# Patient Record
Sex: MALE | Race: WHITE | Employment: OTHER | URBAN - METROPOLITAN AREA
[De-identification: names, ages, dates, MRNs, and addresses within clinical notes are randomized per-mention and may not be internally consistent; named-entity substitution may affect disease eponyms.]

---

## 2017-03-20 ENCOUNTER — OFFICE VISIT (OUTPATIENT)
Dept: FAMILY MEDICINE CLINIC | Facility: CLINIC | Age: 72
End: 2017-03-20

## 2017-03-20 VITALS
WEIGHT: 193 LBS | BODY MASS INDEX: 32.55 KG/M2 | OXYGEN SATURATION: 98 % | DIASTOLIC BLOOD PRESSURE: 80 MMHG | SYSTOLIC BLOOD PRESSURE: 130 MMHG | HEIGHT: 64.5 IN | TEMPERATURE: 97 F | HEART RATE: 76 BPM | RESPIRATION RATE: 18 BRPM

## 2017-03-20 DIAGNOSIS — I10 ESSENTIAL HYPERTENSION WITH GOAL BLOOD PRESSURE LESS THAN 140/90: ICD-10-CM

## 2017-03-20 DIAGNOSIS — R13.10 DYSPHAGIA, UNSPECIFIED TYPE: ICD-10-CM

## 2017-03-20 DIAGNOSIS — M47.812 DJD (DEGENERATIVE JOINT DISEASE), CERVICAL: ICD-10-CM

## 2017-03-20 DIAGNOSIS — M47.816 OSTEOARTHRITIS OF LUMBAR SPINE, UNSPECIFIED SPINAL OSTEOARTHRITIS COMPLICATION STATUS: ICD-10-CM

## 2017-03-20 DIAGNOSIS — Z85.89 HISTORY OF HEAD AND NECK CANCER: ICD-10-CM

## 2017-03-20 DIAGNOSIS — C76.0 HEAD AND NECK CANCER (HCC): Primary | ICD-10-CM

## 2017-03-20 PROCEDURE — 99203 OFFICE O/P NEW LOW 30 MIN: CPT | Performed by: FAMILY MEDICINE

## 2017-03-20 RX ORDER — HYDROCODONE BITARTRATE AND IBUPROFEN 7.5; 2 MG/1; MG/1
TABLET, FILM COATED ORAL
Qty: 90 TABLET | Refills: 0 | Status: SHIPPED | OUTPATIENT
Start: 2017-03-20 | End: 2017-04-18

## 2017-03-21 NOTE — PATIENT INSTRUCTIONS
Chronic Pain  Pain serves an important role. It lets you know something is wrong that needs your attention. When the body heals, pain normally goes away. When pain lasts longer than six months, it is called “chronic” pain.  This is pain that is present e · Changing certain habits can help reduce chronic pain.  They include:  · Eating healthy  · Developing an exercise routine  · Getting enough sleep at night  · Stopping smoking and limiting alcohol use  · Losing excess weight  Follow-up care  Follow up with You may need to follow a dysphagia diet for only a short time. Or you may be on it for a while. It depends on what is causing your dysphagia and how serious it is. A speech-language pathologist (SLP) assesses a person with dysphagia.  The SLP will determine You may also need to make liquids thicker. You can manage your liquids by making thin liquids thicker. This is done by adding a flavorless gel, gum, powder, or other liquid to it. These are called thickeners. You can also buy pre-thickened liquids.  Talk wi While you are on a dysphagia diet  · Follow all instructions about what food and drink you can have. · Do swallowing exercises as advised. · Do not change your food or liquids, even if your swallowing gets better.  Talk with your health care provider heena · Treat an allergic disorder of the esophagus that is causing the problem  · Are injected into the esophagus to help symptoms  Esophageal dilation  Dilation is a procedure that your provider can use to widen your esophagus.  It is most often done when a livier If you have high blood pressure, you may not have any symptoms. If you do have symptoms, they may include headache, dizziness, changes in your vision, chest pain, and shortness of breath.  But even without symptoms, high blood pressure that’s not treated ra · Don’t take medicines that have heart stimulants. This includes many cold and sinus decongestant pills and sprays, as well as diet pills. Check the warnings about hypertension on the label.  Stimulants such as amphetamine or cocaine could be lethal for latonya © 6449-3886 [address], [address] [place]. All rights reserved. This information is not intended as a substitute for professional medical care. Always follow your healthcare professional's instructions.

## 2017-03-21 NOTE — PROGRESS NOTES
[facility] Family Medicine Office Note  Chief Complaint:   Patient presents with:  Consult: new pt, needs referral for Dr Vladimir Guzman      HPI:   This is a 66year old male coming in for establishment of care due to h/o head and neck cancer del Dr. Maye [place]      Social History:    Smoking Status: Former Smoker                   Packs/Day: 0.00  Years:         Smokeless Status: Never Used                        Comment: >10 years    Alcohol Use: No              Family History:  Family History   Prob Height as of this encounter: 64.5\". Weight as of this encounter: 193 lb. Vital signs reviewed. Appears stated age, well groomed.   Physical Exam:  GEN:  Patient is alert and oriented x3, no apparent distress  HEAD:  Normocephalic, atraumatic  HEENT:  E prescription and he is only taking his prescription. He states he only takes hydrocodone once every few days and not every 8 hours so I explained to him that this prescription should last at least 1-2 months and he agreed.   Will do urine drug testing if s

## 2017-04-18 DIAGNOSIS — M47.816 OSTEOARTHRITIS OF LUMBAR SPINE, UNSPECIFIED SPINAL OSTEOARTHRITIS COMPLICATION STATUS: ICD-10-CM

## 2017-04-18 DIAGNOSIS — M47.812 DJD (DEGENERATIVE JOINT DISEASE), CERVICAL: Primary | ICD-10-CM

## 2017-04-18 NOTE — TELEPHONE ENCOUNTER
Pt called requesting a refill of hydrocodone-ibuprofen 7.5-200 MG Oral Tab. Pt will  when ready. Please advise.

## 2017-04-19 RX ORDER — HYDROCODONE BITARTRATE AND IBUPROFEN 7.5; 2 MG/1; MG/1
TABLET, FILM COATED ORAL
Qty: 90 TABLET | Refills: 0 | Status: SHIPPED | OUTPATIENT
Start: 2017-04-19 | End: 2017-05-17

## 2017-05-04 ENCOUNTER — TELEPHONE (OUTPATIENT)
Dept: FAMILY MEDICINE CLINIC | Facility: CLINIC | Age: 72
End: 2017-05-04

## 2017-05-04 RX ORDER — DIAZEPAM 10 MG/1
10 TABLET ORAL 2 TIMES DAILY PRN
Qty: 15 TABLET | Refills: 0 | Status: SHIPPED | OUTPATIENT
Start: 2017-05-04 | End: 2017-07-27

## 2017-05-04 NOTE — TELEPHONE ENCOUNTER
Pt is asking for diazepam (VALIUM) 10 MG Oral Tab to be sent to [facility] [address] [facility], [address] [facility] [address], [phone], [phone]. Pt states that his old Doctor used to prescribe it for him.   Pt was informed

## 2017-05-04 NOTE — TELEPHONE ENCOUNTER
See note below, pt has first visit with  3/20/17, this med last refill with old MD, Dr Nimisha Gayle in Jan. Please advise.

## 2017-05-04 NOTE — TELEPHONE ENCOUNTER
Okay to refill Valium 5 mg 1 tablet twice a day as needed #15 tablets no refills. Patient is to contact Dr. Maris Oglesby for further refills. I would suggest to follow-up in the office.

## 2017-05-10 RX ORDER — DIAZEPAM 10 MG/1
10 TABLET ORAL EVERY 6 HOURS PRN
Qty: 90 TABLET | Refills: 0 | Status: SHIPPED | OUTPATIENT
Start: 2017-05-10 | End: 2017-07-19

## 2017-05-10 NOTE — TELEPHONE ENCOUNTER
Rx pended. Please call pt to have him schedule f/u in June with Dr Jeffrey eSxton for 3 mo follow up meds. Thanks.

## 2017-05-17 DIAGNOSIS — M47.816 OSTEOARTHRITIS OF LUMBAR SPINE, UNSPECIFIED SPINAL OSTEOARTHRITIS COMPLICATION STATUS: ICD-10-CM

## 2017-05-17 DIAGNOSIS — M47.812 DJD (DEGENERATIVE JOINT DISEASE), CERVICAL: Primary | ICD-10-CM

## 2017-05-17 RX ORDER — HYDROCODONE BITARTRATE AND IBUPROFEN 7.5; 2 MG/1; MG/1
TABLET, FILM COATED ORAL
Qty: 90 TABLET | Refills: 0 | Status: SHIPPED | OUTPATIENT
Start: 2017-05-17 | End: 2017-06-14

## 2017-05-17 NOTE — TELEPHONE ENCOUNTER
Pt is calling for refill hydrocodone-ibuprofen 7.5-200 MG Oral Tab. Please advise when ready [phone].

## 2017-05-26 ENCOUNTER — OFFICE VISIT (OUTPATIENT)
Dept: FAMILY MEDICINE CLINIC | Facility: CLINIC | Age: 72
End: 2017-05-26

## 2017-05-26 VITALS
WEIGHT: 190 LBS | BODY MASS INDEX: 29.82 KG/M2 | OXYGEN SATURATION: 94 % | HEART RATE: 89 BPM | SYSTOLIC BLOOD PRESSURE: 142 MMHG | HEIGHT: 67 IN | DIASTOLIC BLOOD PRESSURE: 70 MMHG | TEMPERATURE: 98 F

## 2017-05-26 DIAGNOSIS — R05.9 COUGH: ICD-10-CM

## 2017-05-26 DIAGNOSIS — J00 ACUTE NASOPHARYNGITIS: Primary | ICD-10-CM

## 2017-05-26 PROCEDURE — 99213 OFFICE O/P EST LOW 20 MIN: CPT | Performed by: FAMILY MEDICINE

## 2017-05-26 RX ORDER — AMOXICILLIN AND CLAVULANATE POTASSIUM 600; 42.9 MG/5ML; MG/5ML
875 POWDER, FOR SUSPENSION ORAL 2 TIMES DAILY
Qty: 140 ML | Refills: 0 | Status: SHIPPED | OUTPATIENT
Start: 2017-05-26 | End: 2017-06-05

## 2017-05-26 RX ORDER — ALBUTEROL SULFATE 90 UG/1
2 AEROSOL, METERED RESPIRATORY (INHALATION) EVERY 4 HOURS PRN
Qty: 18 G | Refills: 0 | Status: SHIPPED | OUTPATIENT
Start: 2017-05-26 | End: 2017-07-27 | Stop reason: ALTCHOICE

## 2017-05-26 NOTE — PROGRESS NOTES
CHIEF COMPLAINT:   Patient presents with:  Cough/URI: x 3-4 days. getting worse. keeping pt up at night. no runny nose or sore throat. no headache. HPI:   Beckie Lozano is a 66year old male who presents for cough for  4  days.   Cough started grad Basal cell carcinoma, eyelid 12/2/2013   • DVT (deep venous thrombosis) ([facility] 12/2/2013   • Chronic back pain 12/2/2013   • Unspecified essential hypertension      benign   • Mouth cancer [facility])      dr Kj roe/dr spencer   • Low back pain       Social Albuterol Sulfate HFA (PROAIR HFA) 108 (90 Base) MCG/ACT Inhalation Aero Soln 18 g 0      Sig: Inhale 2 puffs into the lungs every 4 (four) hours as needed for Wheezing. Patient Instructions   Take antibiotics with food and plenty of water.    E

## 2017-05-26 NOTE — PATIENT INSTRUCTIONS
Take antibiotics with food and plenty of water. Eat yogurt or take probiotic daily. (Brian Hamilton is a good example of an OTC probiotic)  Make sure to finish the entire antibiotic treatment.   Increase fluids and rest.   Use inhaler-- 2 puffs every 4-6 hours as

## 2017-06-14 DIAGNOSIS — M47.812 DJD (DEGENERATIVE JOINT DISEASE), CERVICAL: Primary | ICD-10-CM

## 2017-06-14 DIAGNOSIS — M47.816 OSTEOARTHRITIS OF LUMBAR SPINE, UNSPECIFIED SPINAL OSTEOARTHRITIS COMPLICATION STATUS: ICD-10-CM

## 2017-06-14 RX ORDER — HYDROCODONE BITARTRATE AND IBUPROFEN 7.5; 2 MG/1; MG/1
TABLET, FILM COATED ORAL
Qty: 90 TABLET | Refills: 0 | Status: SHIPPED | OUTPATIENT
Start: 2017-06-14 | End: 2017-06-16

## 2017-06-14 NOTE — TELEPHONE ENCOUNTER
hydrocodone-ibuprofen 7.5-200 MG Oral Tab he has 6 pills left     Pt will  when ready please call [phone]

## 2017-06-16 ENCOUNTER — OFFICE VISIT (OUTPATIENT)
Dept: HEMATOLOGY/ONCOLOGY | Facility: HOSPITAL | Age: 72
End: 2017-06-16
Attending: INTERNAL MEDICINE
Payer: MEDICARE

## 2017-06-16 VITALS
WEIGHT: 185 LBS | HEART RATE: 76 BPM | OXYGEN SATURATION: 93 % | SYSTOLIC BLOOD PRESSURE: 139 MMHG | TEMPERATURE: 99 F | RESPIRATION RATE: 20 BRPM | HEIGHT: 67.01 IN | BODY MASS INDEX: 29.03 KG/M2 | DIASTOLIC BLOOD PRESSURE: 90 MMHG

## 2017-06-16 DIAGNOSIS — M47.816 OSTEOARTHRITIS OF LUMBAR SPINE, UNSPECIFIED SPINAL OSTEOARTHRITIS COMPLICATION STATUS: ICD-10-CM

## 2017-06-16 DIAGNOSIS — R13.10 DYSPHAGIA, UNSPECIFIED TYPE: ICD-10-CM

## 2017-06-16 DIAGNOSIS — C76.0 HEAD AND NECK CANCER (HCC): Primary | ICD-10-CM

## 2017-06-16 DIAGNOSIS — M47.812 DJD (DEGENERATIVE JOINT DISEASE), CERVICAL: ICD-10-CM

## 2017-06-16 PROCEDURE — 99214 OFFICE O/P EST MOD 30 MIN: CPT | Performed by: INTERNAL MEDICINE

## 2017-06-16 RX ORDER — HYDROCODONE BITARTRATE AND IBUPROFEN 7.5; 2 MG/1; MG/1
TABLET, FILM COATED ORAL
Qty: 90 TABLET | Refills: 0 | Status: SHIPPED | COMMUNITY
Start: 2017-06-16 | End: 2017-08-07

## 2017-06-16 NOTE — PROGRESS NOTES
Education Record    Learner:  Patient    Disease / Diagnosis:    Barriers / Limitations:  None   Comments:    Method:  Discussion   Comments:    General Topics:  Plan of care reviewed   Comments:    Outcome:  Shows understanding   Comments:    CT of soft t

## 2017-06-16 NOTE — PROGRESS NOTES
Cancer Center Progress Note  Patient Name: Db Rojas   YOB: 1945   Medical Record Number: [id number]   CSN: [id number]    Attending Physician: Jael Silveira M.D.      Date of Visit: 6/16/2017      Chief Complaint:  Patient presents • Head and neck cancer ([facility] 7/25/2013   • Basal cell carcinoma, eyelid 12/2/2013   • DVT (deep venous thrombosis) ([facility] 12/2/2013   • Chronic back pain 12/2/2013   • Unspecified essential hypertension      benign   • Mouth cancer [facility])      dr Jm Queen TAKE 1 TABLET (100 MG TOTAL) BY MOUTH ONCE DAILY. , Disp: 90 tablet, Rfl: 1  •  AmLODIPine Besylate 10 MG Oral Tab, TAKE 1 TABLET (10 MG TOTAL) BY MOUTH DAILY. , Disp: 90 tablet, Rfl: 1  •  [DISCONTINUED] hydrocodone-ibuprofen 7.5-200 MG Oral Tab, TAKE 1 TAB hepatosplenomegaly. Extremities Normal - No cyanosis, clubbing or edema. Integumentary Normal - No rashes and noJaundice   Neurologic Normal - No sensory or motor deficits, normal cerebellar function, normal gait, cranial nerves intact.    Psychiatric Ref Range: 0.90 - 4.00 x10(3) uL 0.94   Monocytes Absolute Latest Ref Range: 0.10 - 0.60 x10(3) uL 0.45   Eosinophils Absolute Latest Ref Range: 0.00 - 0.30 x10(3) uL 0.42 (H)   Basophils Absolute Latest Ref Range: 0.00 - 0.10 x10(3) uL 0.02   Immature Gra

## 2017-07-05 ENCOUNTER — TELEPHONE (OUTPATIENT)
Dept: HEMATOLOGY/ONCOLOGY | Facility: HOSPITAL | Age: 72
End: 2017-07-05

## 2017-07-05 DIAGNOSIS — C76.0 HEAD AND NECK CANCER (HCC): Primary | ICD-10-CM

## 2017-07-07 ENCOUNTER — HOSPITAL ENCOUNTER (OUTPATIENT)
Dept: CT IMAGING | Facility: HOSPITAL | Age: 72
Discharge: HOME | End: 2017-07-07
Attending: INTERNAL MEDICINE
Payer: MEDICARE

## 2017-07-07 DIAGNOSIS — C76.0 HEAD AND NECK CANCER (HCC): ICD-10-CM

## 2017-07-07 PROCEDURE — 70491 CT SOFT TISSUE NECK W/DYE: CPT | Performed by: INTERNAL MEDICINE

## 2017-07-17 NOTE — TELEPHONE ENCOUNTER
AmLODIPine Besylate 10 MG Oral Tab ( pt only has 2 pills of this)    Losartan Potassium 100 MG Oral Tab    atenolol 50 MG Oral Tab    diazepam (VALIUM) 10 MG Oral Tab    These have not been filled my Dr. Charity Richards before pt saw Dr. Charity Richards for the first time 3/20/

## 2017-07-17 NOTE — TELEPHONE ENCOUNTER
Pt needs med check for his meds (htn) per Dr Mauro Hernandez note from vs in march. He was due in June. Have him schedule so we can fill to appt. Thanks.

## 2017-07-19 RX ORDER — DIAZEPAM 10 MG/1
10 TABLET ORAL EVERY 6 HOURS PRN
Qty: 90 TABLET | Refills: 0 | Status: SHIPPED | OUTPATIENT
Start: 2017-07-19 | End: 2017-07-27

## 2017-07-19 RX ORDER — AMLODIPINE BESYLATE 10 MG/1
TABLET ORAL
Qty: 90 TABLET | Refills: 0 | Status: SHIPPED | OUTPATIENT
Start: 2017-07-19 | End: 2017-07-27

## 2017-07-19 RX ORDER — LOSARTAN POTASSIUM 100 MG/1
TABLET ORAL
Qty: 90 TABLET | Refills: 0 | Status: SHIPPED | OUTPATIENT
Start: 2017-07-19 | End: 2017-07-27

## 2017-07-25 RX ORDER — METOPROLOL SUCCINATE 50 MG/1
50 TABLET, EXTENDED RELEASE ORAL DAILY
Qty: 90 TABLET | Refills: 0 | Status: SHIPPED | OUTPATIENT
Start: 2017-07-25 | End: 2017-11-21

## 2017-07-25 NOTE — TELEPHONE ENCOUNTER
Atenolol Rx can't be filled at this time   Atenolol generic is nationwide back ordered   Brand name is expensive    Pharm called for alternative order for now    Thanks. Please advise.

## 2017-07-27 ENCOUNTER — APPOINTMENT (OUTPATIENT)
Dept: LAB | Age: 72
End: 2017-07-27
Attending: FAMILY MEDICINE
Payer: MEDICARE

## 2017-07-27 ENCOUNTER — OFFICE VISIT (OUTPATIENT)
Dept: FAMILY MEDICINE CLINIC | Facility: CLINIC | Age: 72
End: 2017-07-27

## 2017-07-27 VITALS
RESPIRATION RATE: 16 BRPM | DIASTOLIC BLOOD PRESSURE: 84 MMHG | HEART RATE: 64 BPM | BODY MASS INDEX: 27.94 KG/M2 | SYSTOLIC BLOOD PRESSURE: 130 MMHG | HEIGHT: 67 IN | TEMPERATURE: 99 F | WEIGHT: 178 LBS

## 2017-07-27 DIAGNOSIS — Z12.5 SCREENING FOR PROSTATE CANCER: ICD-10-CM

## 2017-07-27 DIAGNOSIS — M47.812 DJD (DEGENERATIVE JOINT DISEASE), CERVICAL: ICD-10-CM

## 2017-07-27 DIAGNOSIS — F41.1 GENERALIZED ANXIETY DISORDER: ICD-10-CM

## 2017-07-27 DIAGNOSIS — M47.816 OSTEOARTHRITIS OF LUMBAR SPINE, UNSPECIFIED SPINAL OSTEOARTHRITIS COMPLICATION STATUS: ICD-10-CM

## 2017-07-27 DIAGNOSIS — Z00.00 ROUTINE GENERAL MEDICAL EXAMINATION AT A HEALTH CARE FACILITY: Primary | ICD-10-CM

## 2017-07-27 DIAGNOSIS — N40.0 ENLARGED PROSTATE ON RECTAL EXAMINATION: ICD-10-CM

## 2017-07-27 DIAGNOSIS — Z85.89 HISTORY OF HEAD AND NECK CANCER: Chronic | ICD-10-CM

## 2017-07-27 DIAGNOSIS — H91.90 HEARING LOSS, UNSPECIFIED HEARING LOSS TYPE, UNSPECIFIED LATERALITY: Chronic | ICD-10-CM

## 2017-07-27 DIAGNOSIS — I10 ESSENTIAL HYPERTENSION: Chronic | ICD-10-CM

## 2017-07-27 DIAGNOSIS — M54.6 CHRONIC THORACIC BACK PAIN, UNSPECIFIED BACK PAIN LATERALITY: Chronic | ICD-10-CM

## 2017-07-27 DIAGNOSIS — Z23 NEED FOR VACCINATION: ICD-10-CM

## 2017-07-27 DIAGNOSIS — R13.10 DYSPHAGIA, UNSPECIFIED TYPE: Chronic | ICD-10-CM

## 2017-07-27 DIAGNOSIS — Z00.00 ENCOUNTER FOR MEDICARE ANNUAL WELLNESS EXAM: ICD-10-CM

## 2017-07-27 DIAGNOSIS — C44.111: Chronic | ICD-10-CM

## 2017-07-27 DIAGNOSIS — G89.29 CHRONIC THORACIC BACK PAIN, UNSPECIFIED BACK PAIN LATERALITY: Chronic | ICD-10-CM

## 2017-07-27 LAB
CHOLEST SMN-MCNC: 166 MG/DL (ref ?–200)
COMPLEXED PSA SERPL-MCNC: 1.85 NG/ML (ref 0.01–4)
HDLC SERPL-MCNC: 50 MG/DL (ref 45–?)
HDLC SERPL: 3.32 {RATIO} (ref ?–4.97)
LDLC SERPL CALC-MCNC: 92 MG/DL (ref ?–130)
LDLC SERPL-MCNC: 24 MG/DL (ref 5–40)
NONHDLC SERPL-MCNC: 116 MG/DL (ref ?–130)
TRIGLYCERIDES: 120 MG/DL (ref ?–150)

## 2017-07-27 PROCEDURE — G0439 PPPS, SUBSEQ VISIT: HCPCS | Performed by: FAMILY MEDICINE

## 2017-07-27 PROCEDURE — 96160 PT-FOCUSED HLTH RISK ASSMT: CPT | Performed by: FAMILY MEDICINE

## 2017-07-27 PROCEDURE — 90732 PPSV23 VACC 2 YRS+ SUBQ/IM: CPT | Performed by: FAMILY MEDICINE

## 2017-07-27 PROCEDURE — 36415 COLL VENOUS BLD VENIPUNCTURE: CPT | Performed by: FAMILY MEDICINE

## 2017-07-27 PROCEDURE — G0009 ADMIN PNEUMOCOCCAL VACCINE: HCPCS | Performed by: FAMILY MEDICINE

## 2017-07-27 RX ORDER — DIAZEPAM 10 MG/1
10 TABLET ORAL EVERY 12 HOURS PRN
Qty: 60 TABLET | Refills: 0 | Status: SHIPPED | OUTPATIENT
Start: 2017-07-27 | End: 2017-09-05

## 2017-07-27 RX ORDER — HYDROCODONE BITARTRATE AND IBUPROFEN 7.5; 2 MG/1; MG/1
TABLET, FILM COATED ORAL
Qty: 90 TABLET | Refills: 0 | Status: CANCELLED | OUTPATIENT
Start: 2017-07-27

## 2017-07-27 NOTE — PROGRESS NOTES
HPI:   Beckie Lozano is a 66year old male who presents for a MA (Medicare Advantage) Supervisit (Once per calendar year). Patient has no complaints or concerns today. Denies any recent illnesses or hospitalizations.   Prevnar up to date and pneumova history of Basal cell carcinoma, eyelid (12/2/2013); Chronic back pain (12/2/2013); DVT (deep venous thrombosis) ([facility] (12/2/2013); Head and neck cancer ([facility] (7/25/2013); HTN (hypertension) (12/2/2013);  Low back pain; Mouth cancer ([facility] and Unspecified e AIDE)           Visual Acuity  Right Eye Visual Acuity: Corrected Right Eye Chart Acuity: 20/40   Left Eye Visual Acuity: Corrected Left Eye Chart Acuity: 20/40   Both Eyes Visual Acuity: Corrected Both Eyes Chart Acuity: 20/40   Able To Tolerate Visual Ac examination at a [facility] facility  -  prevnar up to date  -  Pneumovax given today  -  Refuses colonoscopy  -  PSA and prostate exam done today    DJD (degenerative joint disease), cervical  -  Stable  -  Continue hydrocodone PRN    Osteoarthritis of l aspirin therapy outweigh the benefits and declines aspirin therapy            Diet assessment: good     Advanced Directive:  Living Will on file in [facility]? Tanika Rodriguez does not have a Living Will on file in [facility]  Discussed with patient and provided infor urine?: 0-No    Do you have difficulty seeing?: 1-Yes    Do you have any difficulty walking or getting up?: 1-Yes    Do you have any tripping hazards?: 0-No    Are you on multiple medications?: 1-Yes    Does pain affect your day to day activities?: 0-No found. Fecal Occult Blood Annually No results found for: FOB No flowsheet data found. Glaucoma Screening      Ophthalmology Visit Annually: Diabetics, FHx Glaucoma, AA>50, > 65 No flowsheet data found.     Prostate Cancer Screening      PSA

## 2017-07-27 NOTE — PATIENT INSTRUCTIONS
Jeaneth Brush's SCREENING SCHEDULE   Tests on this list are recommended by your physician but may not be covered, or covered at this frequency, by your insurer. Please check with your insurance carrier before scheduling to verify coverage.     PREVENTATI Maintenance if applicable    Flex Sigmoidoscopy Screen  Covered every 5 years No results found for this or any previous visit. No flowsheet data found.      Fecal Occult Blood   Covered Annually No results found for: FOB, OCCULTSTOOL No flowsheet data found Medically needed    Zoster (Not covered by Medicare Part B) No orders found for this or any previous visit. This may be covered with your pharmacy  prescription benefits     Recommended Websites for Advanced Directives    [email or web address] Depression All men in this age group At routine exams   Type 2 diabetes or prediabetes All adults beginning at age 39 and adults without symptoms at any age who are overweight or obese and have 1 or more other risk factors for diabetes At least every 3 y B (HIB) Men at increased risk for infection – talk with your healthcare provider 1 to 3 doses   Influenza (flu) All men in this age [de-identified] Once a year   Meningococcal Men at increased risk for infection – talk with your healthcare provider 1 or more doses

## 2017-08-07 ENCOUNTER — TELEPHONE (OUTPATIENT)
Dept: FAMILY MEDICINE CLINIC | Facility: CLINIC | Age: 72
End: 2017-08-07

## 2017-08-07 DIAGNOSIS — M47.816 OSTEOARTHRITIS OF LUMBAR SPINE, UNSPECIFIED SPINAL OSTEOARTHRITIS COMPLICATION STATUS: ICD-10-CM

## 2017-08-07 DIAGNOSIS — M47.812 DJD (DEGENERATIVE JOINT DISEASE), CERVICAL: ICD-10-CM

## 2017-08-07 RX ORDER — HYDROCODONE BITARTRATE AND IBUPROFEN 7.5; 2 MG/1; MG/1
TABLET, FILM COATED ORAL
Qty: 90 TABLET | Refills: 0 | Status: SHIPPED | OUTPATIENT
Start: 2017-08-07 | End: 2017-09-05

## 2017-08-07 NOTE — TELEPHONE ENCOUNTER
Pt states he called his [address] ([facility] [address] [facility] [place], [address] [facility] [address] [phone], [phone]) regarding a refill of atenolol and this med is currently on backorder.  Pt looking for substitute that has been given for this in the pa

## 2017-08-07 NOTE — TELEPHONE ENCOUNTER
Pt notified Dr. Dong Diehl sent a prescription for Metoprolol ER to take the place of the atenolol that is back ordered. Pt verbalized understanding.

## 2017-08-07 NOTE — TELEPHONE ENCOUNTER
I think Mo Ramesh already reached out to him but yes atenolol is on back order so I started him on Toprol 50 mg XL daily.

## 2017-08-07 NOTE — TELEPHONE ENCOUNTER
Atenolol is back ordered. He was on 50 mg daily. Do you want him to substitute with Metoprolol succinate ER 50 mg 1 daily? If not please advise what you would like him to use.

## 2017-09-05 DIAGNOSIS — M47.816 OSTEOARTHRITIS OF LUMBAR SPINE, UNSPECIFIED SPINAL OSTEOARTHRITIS COMPLICATION STATUS: ICD-10-CM

## 2017-09-05 DIAGNOSIS — F41.1 GENERALIZED ANXIETY DISORDER: ICD-10-CM

## 2017-09-05 DIAGNOSIS — M47.812 DJD (DEGENERATIVE JOINT DISEASE), CERVICAL: ICD-10-CM

## 2017-09-05 RX ORDER — HYDROCODONE BITARTRATE AND IBUPROFEN 7.5; 2 MG/1; MG/1
TABLET, FILM COATED ORAL
Qty: 90 TABLET | Refills: 0 | Status: SHIPPED | OUTPATIENT
Start: 2017-09-05 | End: 2017-10-03

## 2017-09-05 RX ORDER — DIAZEPAM 10 MG/1
10 TABLET ORAL EVERY 12 HOURS PRN
Qty: 60 TABLET | Refills: 0 | Status: SHIPPED | OUTPATIENT
Start: 2017-09-05 | End: 2017-10-25

## 2017-09-05 NOTE — TELEPHONE ENCOUNTER
Last fill for the hydrocodone-ibu 8/7 #90  Last fill valium 7/27 #60 at pt's px  Please approve if appropriate. Thanks.

## 2017-10-03 DIAGNOSIS — M47.816 OSTEOARTHRITIS OF LUMBAR SPINE, UNSPECIFIED SPINAL OSTEOARTHRITIS COMPLICATION STATUS: ICD-10-CM

## 2017-10-03 DIAGNOSIS — M47.812 DJD (DEGENERATIVE JOINT DISEASE), CERVICAL: ICD-10-CM

## 2017-10-03 NOTE — TELEPHONE ENCOUNTER
Last med visit 7/27/17 medicare advantage supervisit  Med last ordered 9/5/17 #90  **see med refill order pended for review** thanks

## 2017-10-04 RX ORDER — HYDROCODONE BITARTRATE AND IBUPROFEN 7.5; 2 MG/1; MG/1
TABLET, FILM COATED ORAL
Qty: 90 TABLET | Refills: 0 | Status: SHIPPED | OUTPATIENT
Start: 2017-10-04 | End: 2017-11-03

## 2017-10-25 DIAGNOSIS — F41.1 GENERALIZED ANXIETY DISORDER: ICD-10-CM

## 2017-10-25 RX ORDER — AMLODIPINE BESYLATE 10 MG/1
TABLET ORAL
Qty: 90 TABLET | Refills: 0 | Status: SHIPPED | OUTPATIENT
Start: 2017-10-25 | End: 2018-02-14

## 2017-10-25 RX ORDER — LOSARTAN POTASSIUM 100 MG/1
TABLET ORAL
Qty: 90 TABLET | Refills: 0 | Status: SHIPPED | OUTPATIENT
Start: 2017-10-25 | End: 2018-01-26

## 2017-10-26 RX ORDER — DIAZEPAM 10 MG/1
10 TABLET ORAL EVERY 12 HOURS PRN
Qty: 60 TABLET | Refills: 0 | Status: SHIPPED | OUTPATIENT
Start: 2017-10-26 | End: 2017-12-08

## 2017-11-02 ENCOUNTER — TELEPHONE (OUTPATIENT)
Dept: FAMILY MEDICINE CLINIC | Facility: CLINIC | Age: 72
End: 2017-11-02

## 2017-11-02 NOTE — TELEPHONE ENCOUNTER
Pt needs appt so we can fill. Per Dr Jimbo Giraldo pt's need to be seen q 3 months in ofc for pain meds. Last vs 7/27  Please have pt schedule. Thanks.

## 2017-11-03 ENCOUNTER — OFFICE VISIT (OUTPATIENT)
Dept: FAMILY MEDICINE CLINIC | Facility: CLINIC | Age: 72
End: 2017-11-03

## 2017-11-03 VITALS
SYSTOLIC BLOOD PRESSURE: 138 MMHG | HEIGHT: 67 IN | RESPIRATION RATE: 16 BRPM | OXYGEN SATURATION: 98 % | HEART RATE: 76 BPM | DIASTOLIC BLOOD PRESSURE: 84 MMHG | TEMPERATURE: 97 F | WEIGHT: 182 LBS | BODY MASS INDEX: 28.56 KG/M2

## 2017-11-03 DIAGNOSIS — M47.812 DJD (DEGENERATIVE JOINT DISEASE), CERVICAL: Primary | ICD-10-CM

## 2017-11-03 DIAGNOSIS — M47.816 OSTEOARTHRITIS OF LUMBAR SPINE, UNSPECIFIED SPINAL OSTEOARTHRITIS COMPLICATION STATUS: ICD-10-CM

## 2017-11-03 PROCEDURE — 99213 OFFICE O/P EST LOW 20 MIN: CPT | Performed by: FAMILY MEDICINE

## 2017-11-03 RX ORDER — HYDROCODONE BITARTRATE AND IBUPROFEN 7.5; 2 MG/1; MG/1
TABLET, FILM COATED ORAL
Qty: 90 TABLET | Refills: 0 | Status: SHIPPED | OUTPATIENT
Start: 2017-11-03 | End: 2017-11-29

## 2017-11-03 NOTE — PROGRESS NOTES
[facility] Family Medicine Office Note  Chief Complaint:   Patient presents with:  Medication Follow-Up      HPI:   This is a 74year old male coming in for follow-up of degenerative joint disease of the cervical spine as well as osteoarthritis o losartan 100 MG Oral Tab TAKE 1 TABLET(100 MG) BY MOUTH EVERY DAY Disp: 90 tablet Rfl: 0   Metoprolol Succinate ER (TOPROL XL) 50 MG Oral Tablet 24 Hr Take 1 tablet (50 mg total) by mouth daily.  Disp: 90 tablet Rfl: 0   ATENOLOL 50 MG Oral Tab TAKE 1 TAB osteoarthritis complication status  -  Stable  -  Continue hydrocodone PRN  -  Consider meloxicam at next OV  - hydrocodone-ibuprofen 7.5-200 MG Oral Tab; TAKE 1 TABLET BY MOUTH EVERY 8 HOURS AS NEEDED FOR PAIN  Dispense: 90 tablet;  Refill: 0      Meds & R

## 2017-11-21 RX ORDER — METOPROLOL SUCCINATE 50 MG/1
TABLET, EXTENDED RELEASE ORAL
Qty: 90 TABLET | Refills: 0 | Status: SHIPPED | OUTPATIENT
Start: 2017-11-21 | End: 2018-01-26

## 2017-11-29 DIAGNOSIS — M47.812 DJD (DEGENERATIVE JOINT DISEASE), CERVICAL: ICD-10-CM

## 2017-11-29 DIAGNOSIS — M47.816 OSTEOARTHRITIS OF LUMBAR SPINE, UNSPECIFIED SPINAL OSTEOARTHRITIS COMPLICATION STATUS: ICD-10-CM

## 2017-11-29 RX ORDER — HYDROCODONE BITARTRATE AND IBUPROFEN 7.5; 2 MG/1; MG/1
TABLET, FILM COATED ORAL
Qty: 90 TABLET | Refills: 0 | Status: SHIPPED | OUTPATIENT
Start: 2017-11-29 | End: 2017-12-27

## 2017-12-08 DIAGNOSIS — F41.1 GENERALIZED ANXIETY DISORDER: ICD-10-CM

## 2017-12-08 RX ORDER — DIAZEPAM 10 MG/1
TABLET ORAL
Qty: 60 TABLET | Refills: 0 | Status: SHIPPED | OUTPATIENT
Start: 2017-12-08 | End: 2018-01-05

## 2017-12-27 DIAGNOSIS — M47.812 DJD (DEGENERATIVE JOINT DISEASE), CERVICAL: ICD-10-CM

## 2017-12-27 DIAGNOSIS — M47.816 OSTEOARTHRITIS OF LUMBAR SPINE, UNSPECIFIED SPINAL OSTEOARTHRITIS COMPLICATION STATUS: ICD-10-CM

## 2017-12-27 RX ORDER — HYDROCODONE BITARTRATE AND IBUPROFEN 7.5; 2 MG/1; MG/1
TABLET, FILM COATED ORAL
Qty: 90 TABLET | Refills: 0 | Status: SHIPPED | OUTPATIENT
Start: 2017-12-27 | End: 2017-12-27

## 2017-12-27 RX ORDER — HYDROCODONE BITARTRATE AND IBUPROFEN 7.5; 2 MG/1; MG/1
TABLET, FILM COATED ORAL
Qty: 90 TABLET | Refills: 0 | Status: SHIPPED | OUTPATIENT
Start: 2017-12-27 | End: 2018-01-23

## 2017-12-27 NOTE — TELEPHONE ENCOUNTER
Pt requesting a refill of hydrocodone-ibuprofen 7.5-200 MG Oral Tab, has 2 to 3 left, per pt. Pt will  when ready. Please call [phone].

## 2018-01-03 ENCOUNTER — TELEPHONE (OUTPATIENT)
Dept: FAMILY MEDICINE CLINIC | Facility: CLINIC | Age: 73
End: 2018-01-03

## 2018-01-05 DIAGNOSIS — F41.1 GENERALIZED ANXIETY DISORDER: ICD-10-CM

## 2018-01-05 RX ORDER — DIAZEPAM 10 MG/1
TABLET ORAL
Qty: 60 TABLET | Refills: 0 | Status: SHIPPED | OUTPATIENT
Start: 2018-01-05 | End: 2018-02-02

## 2018-01-05 NOTE — TELEPHONE ENCOUNTER
Non protocol medication. LOV 11/3/2017. Last refill 12/8/2017.   Please approve if appropriate, thank you

## 2018-01-23 DIAGNOSIS — M47.816 OSTEOARTHRITIS OF LUMBAR SPINE, UNSPECIFIED SPINAL OSTEOARTHRITIS COMPLICATION STATUS: ICD-10-CM

## 2018-01-23 DIAGNOSIS — M47.812 DJD (DEGENERATIVE JOINT DISEASE), CERVICAL: ICD-10-CM

## 2018-01-23 NOTE — TELEPHONE ENCOUNTER
Pt would like refill of hydrocodone-ibuprofen 7.5-200 MG Oral Tab. He will . Please advise. Thank you.

## 2018-01-23 NOTE — TELEPHONE ENCOUNTER
Pt last ov 11/3/17 for DJD ( degenerative joint disease), last refill 12/27/17 #90 taking 3 times daily, pt should still have 3-4 days worth of medication.   Please review and approve if appropriate, thank you

## 2018-01-24 RX ORDER — HYDROCODONE BITARTRATE AND IBUPROFEN 7.5; 2 MG/1; MG/1
TABLET, FILM COATED ORAL
Qty: 90 TABLET | Refills: 0 | Status: SHIPPED | OUTPATIENT
Start: 2018-01-24 | End: 2018-02-21

## 2018-01-26 ENCOUNTER — TELEPHONE (OUTPATIENT)
Dept: FAMILY MEDICINE CLINIC | Facility: CLINIC | Age: 73
End: 2018-01-26

## 2018-01-26 RX ORDER — LOSARTAN POTASSIUM 100 MG/1
TABLET ORAL
Qty: 30 TABLET | Refills: 0 | Status: SHIPPED | OUTPATIENT
Start: 2018-01-26 | End: 2018-02-14

## 2018-01-26 RX ORDER — METOPROLOL SUCCINATE 50 MG/1
50 TABLET, EXTENDED RELEASE ORAL
Qty: 30 TABLET | Refills: 0 | Status: SHIPPED | OUTPATIENT
Start: 2018-01-26 | End: 2018-02-14

## 2018-02-02 ENCOUNTER — TELEPHONE (OUTPATIENT)
Dept: FAMILY MEDICINE CLINIC | Facility: CLINIC | Age: 73
End: 2018-02-02

## 2018-02-02 DIAGNOSIS — F41.1 GENERALIZED ANXIETY DISORDER: ICD-10-CM

## 2018-02-02 RX ORDER — DIAZEPAM 10 MG/1
TABLET ORAL
Qty: 60 TABLET | Refills: 0 | Status: SHIPPED | OUTPATIENT
Start: 2018-02-02 | End: 2018-03-26

## 2018-02-02 NOTE — TELEPHONE ENCOUNTER
Please call pt as he needs a med check per Dr Madelaine Hill in [facility],  Refill Valium pending.

## 2018-02-14 ENCOUNTER — APPOINTMENT (OUTPATIENT)
Dept: LAB | Age: 73
End: 2018-02-14
Attending: FAMILY MEDICINE
Payer: MEDICARE

## 2018-02-14 ENCOUNTER — OFFICE VISIT (OUTPATIENT)
Dept: FAMILY MEDICINE CLINIC | Facility: CLINIC | Age: 73
End: 2018-02-14

## 2018-02-14 VITALS
HEIGHT: 67 IN | BODY MASS INDEX: 27.78 KG/M2 | TEMPERATURE: 97 F | RESPIRATION RATE: 14 BRPM | WEIGHT: 177 LBS | OXYGEN SATURATION: 98 % | SYSTOLIC BLOOD PRESSURE: 130 MMHG | HEART RATE: 76 BPM | DIASTOLIC BLOOD PRESSURE: 80 MMHG

## 2018-02-14 DIAGNOSIS — M47.812 DJD (DEGENERATIVE JOINT DISEASE), CERVICAL: Primary | ICD-10-CM

## 2018-02-14 DIAGNOSIS — F41.1 GENERALIZED ANXIETY DISORDER: ICD-10-CM

## 2018-02-14 DIAGNOSIS — M47.816 OSTEOARTHRITIS OF LUMBAR SPINE, UNSPECIFIED SPINAL OSTEOARTHRITIS COMPLICATION STATUS: ICD-10-CM

## 2018-02-14 DIAGNOSIS — I10 ESSENTIAL HYPERTENSION WITH GOAL BLOOD PRESSURE LESS THAN 140/90: ICD-10-CM

## 2018-02-14 LAB
ALBUMIN SERPL-MCNC: 3.6 G/DL (ref 3.5–4.8)
ALP LIVER SERPL-CCNC: 69 U/L (ref 45–117)
ALT SERPL-CCNC: 23 U/L (ref 17–63)
AST SERPL-CCNC: 14 U/L (ref 15–41)
BILIRUB SERPL-MCNC: 0.6 MG/DL (ref 0.1–2)
BUN BLD-MCNC: 17 MG/DL (ref 8–20)
CALCIUM BLD-MCNC: 9.2 MG/DL (ref 8.3–10.3)
CHLORIDE: 105 MMOL/L (ref 101–111)
CO2: 28 MMOL/L (ref 22–32)
CREAT BLD-MCNC: 1.26 MG/DL (ref 0.7–1.3)
GLUCOSE BLD-MCNC: 84 MG/DL (ref 70–99)
M PROTEIN MFR SERPL ELPH: 7.5 G/DL (ref 6.1–8.3)
POTASSIUM SERPL-SCNC: 3.8 MMOL/L (ref 3.6–5.1)
SODIUM SERPL-SCNC: 140 MMOL/L (ref 136–144)

## 2018-02-14 PROCEDURE — 36415 COLL VENOUS BLD VENIPUNCTURE: CPT

## 2018-02-14 PROCEDURE — 99214 OFFICE O/P EST MOD 30 MIN: CPT | Performed by: FAMILY MEDICINE

## 2018-02-14 PROCEDURE — 80053 COMPREHEN METABOLIC PANEL: CPT

## 2018-02-14 RX ORDER — AMLODIPINE BESYLATE 10 MG/1
TABLET ORAL
Qty: 90 TABLET | Refills: 0 | Status: SHIPPED | OUTPATIENT
Start: 2018-02-14 | End: 2018-04-15

## 2018-02-14 RX ORDER — METOPROLOL SUCCINATE 50 MG/1
50 TABLET, EXTENDED RELEASE ORAL
Qty: 90 TABLET | Refills: 0 | Status: SHIPPED | OUTPATIENT
Start: 2018-02-14 | End: 2018-07-18

## 2018-02-14 RX ORDER — LOSARTAN POTASSIUM 100 MG/1
TABLET ORAL
Qty: 90 TABLET | Refills: 0 | Status: SHIPPED | OUTPATIENT
Start: 2018-02-14 | End: 2018-04-15

## 2018-02-14 NOTE — PROGRESS NOTES
[facility] Family Medicine Office Note  Chief Complaint:   Patient presents with:  Medication Follow-Up: HTN      HPI:   This is a 74year old male coming in for follow-up of degenerative joint disease of the cervical spine as well as osteoarthri Rfl: 0   losartan 100 MG Oral Tab TAKE 1 TABLET(100 MG) BY MOUTH EVERY DAY Disp: 90 tablet Rfl: 0   AmLODIPine Besylate 10 MG Oral Tab TAKE 1 TABLET(10 MG) BY MOUTH DAILY Disp: 90 tablet Rfl: 0   DIAZEPAM 10 MG Oral Tab TAKE ONE TABLET BY MOUTH EVERY TWELV 7.5-200 MG Oral Tab; TAKE 1 TABLET BY MOUTH EVERY 8 HOURS AS NEEDED FOR PAIN  Dispense: 90 tablet; Refill: 0    2.  Osteoarthritis of lumbar spine, unspecified spinal osteoarthritis complication status  -  Stable  -  Continue hydrocodone PRN  -  Not due for

## 2018-02-21 DIAGNOSIS — M47.816 OSTEOARTHRITIS OF LUMBAR SPINE, UNSPECIFIED SPINAL OSTEOARTHRITIS COMPLICATION STATUS: ICD-10-CM

## 2018-02-21 DIAGNOSIS — M47.812 DJD (DEGENERATIVE JOINT DISEASE), CERVICAL: ICD-10-CM

## 2018-02-21 RX ORDER — HYDROCODONE BITARTRATE AND IBUPROFEN 7.5; 2 MG/1; MG/1
TABLET, FILM COATED ORAL
Qty: 90 TABLET | Refills: 0 | Status: SHIPPED | OUTPATIENT
Start: 2018-02-21 | End: 2018-03-19

## 2018-02-21 NOTE — TELEPHONE ENCOUNTER
Patient requesting refill of hydrocodone-ibuprofen 7.5-200 MG Oral Tab. Patient informed of 48 hour refill policy excluding weekends and holidays. Patient will pick script up when ready.

## 2018-03-02 ENCOUNTER — OFFICE VISIT (OUTPATIENT)
Dept: FAMILY MEDICINE CLINIC | Facility: CLINIC | Age: 73
End: 2018-03-02

## 2018-03-02 VITALS
HEIGHT: 67 IN | OXYGEN SATURATION: 98 % | TEMPERATURE: 98 F | WEIGHT: 177 LBS | BODY MASS INDEX: 27.78 KG/M2 | DIASTOLIC BLOOD PRESSURE: 86 MMHG | RESPIRATION RATE: 18 BRPM | SYSTOLIC BLOOD PRESSURE: 124 MMHG | HEART RATE: 101 BPM

## 2018-03-02 DIAGNOSIS — J06.9 UPPER RESPIRATORY TRACT INFECTION, UNSPECIFIED TYPE: Primary | ICD-10-CM

## 2018-03-02 PROCEDURE — 99213 OFFICE O/P EST LOW 20 MIN: CPT | Performed by: NURSE PRACTITIONER

## 2018-03-02 RX ORDER — AZITHROMYCIN 250 MG/1
TABLET, FILM COATED ORAL
Qty: 6 TABLET | Refills: 0 | Status: SHIPPED | OUTPATIENT
Start: 2018-03-02 | End: 2018-06-13 | Stop reason: ALTCHOICE

## 2018-03-03 NOTE — PROGRESS NOTES
Darcy Ordoñez is a 74year old male. HPI:   Patient presents today reporting a productive cough for the past 10 days. He reports that the cough is worse at night. He denies any fever, chills or body aches. He has not tried any OTC medications.  Denies any GENERAL HEALTH: feels well otherwise. Denies any fever, chills or body aches. HEENT: denies sore throat, ear pain, sinus pressure or sinus congestion. RESPIRATORY: denies shortness of breath with exertion, reports cough x 10 days.   CARDIOVASCULAR: katy Patient stated understanding and agreeable to the plan of care.

## 2018-03-13 ENCOUNTER — TELEPHONE (OUTPATIENT)
Dept: FAMILY MEDICINE CLINIC | Facility: CLINIC | Age: 73
End: 2018-03-13

## 2018-03-13 NOTE — TELEPHONE ENCOUNTER
LMTCB and schedule first available Medicare Annual [facility] HRA CPE for 45 minutes with Dr Tj Yancey.

## 2018-03-19 DIAGNOSIS — M47.812 DJD (DEGENERATIVE JOINT DISEASE), CERVICAL: ICD-10-CM

## 2018-03-19 DIAGNOSIS — M47.816 OSTEOARTHRITIS OF LUMBAR SPINE, UNSPECIFIED SPINAL OSTEOARTHRITIS COMPLICATION STATUS: ICD-10-CM

## 2018-03-19 NOTE — TELEPHONE ENCOUNTER
Please double check my math but it seems that he is about 4-5 days early for his [place] refill. It was last filled on February 21 and this is only March 19. There are only 28 days in February.

## 2018-03-22 RX ORDER — HYDROCODONE BITARTRATE AND IBUPROFEN 7.5; 2 MG/1; MG/1
TABLET, FILM COATED ORAL
Qty: 90 TABLET | Refills: 0 | Status: SHIPPED | OUTPATIENT
Start: 2018-03-22 | End: 2018-04-18

## 2018-03-26 DIAGNOSIS — F41.1 GENERALIZED ANXIETY DISORDER: ICD-10-CM

## 2018-03-26 RX ORDER — DIAZEPAM 10 MG/1
TABLET ORAL
Qty: 60 TABLET | Refills: 0 | Status: SHIPPED | OUTPATIENT
Start: 2018-03-26 | End: 2018-04-24

## 2018-03-26 NOTE — TELEPHONE ENCOUNTER
Pharmacy called . Pt stated he called our office requesting a refill for his Diazepam and we authorized it but the pharmacy does not have anything on file that we authorized it. I do not see any documentation in the chart regarding this refill.  Please Marlen Macedo

## 2018-04-05 ENCOUNTER — TELEPHONE (OUTPATIENT)
Dept: FAMILY MEDICINE CLINIC | Facility: CLINIC | Age: 73
End: 2018-04-05

## 2018-04-05 NOTE — TELEPHONE ENCOUNTER
LMTCB and schedule first available Medicare Annual [facility] HRA CPE for 45 minutes with Dr Hamzah Sandhu. Second attempt.  Unable to Reach letter sent to home address: [address], [address], [address] [facility]

## 2018-04-15 DIAGNOSIS — I10 ESSENTIAL HYPERTENSION WITH GOAL BLOOD PRESSURE LESS THAN 140/90: ICD-10-CM

## 2018-04-17 RX ORDER — LOSARTAN POTASSIUM 100 MG/1
TABLET ORAL
Qty: 90 TABLET | Refills: 0 | Status: SHIPPED | OUTPATIENT
Start: 2018-04-17 | End: 2018-10-10

## 2018-04-17 RX ORDER — AMLODIPINE BESYLATE 10 MG/1
TABLET ORAL
Qty: 90 TABLET | Refills: 0 | Status: SHIPPED | OUTPATIENT
Start: 2018-04-17 | End: 2018-08-16

## 2018-04-18 DIAGNOSIS — M47.816 OSTEOARTHRITIS OF LUMBAR SPINE, UNSPECIFIED SPINAL OSTEOARTHRITIS COMPLICATION STATUS: ICD-10-CM

## 2018-04-18 DIAGNOSIS — M47.812 DJD (DEGENERATIVE JOINT DISEASE), CERVICAL: ICD-10-CM

## 2018-04-18 NOTE — TELEPHONE ENCOUNTER
Medication was last dispensed on April 23, 2018. This makes him approximately 4 days early on his prescription. He will need to requested again in 3 days.

## 2018-04-18 NOTE — TELEPHONE ENCOUNTER
Not a protocol medication last OV 2/14/18 last refill 3/22/18 #90. Pt taking tid.   Please review and refill if appropriate, thank you

## 2018-04-20 NOTE — TELEPHONE ENCOUNTER
Pt called, I explained below. He stated that is fine he would like to pick it up Monday. Please advise. Thank you.

## 2018-04-23 RX ORDER — HYDROCODONE BITARTRATE AND IBUPROFEN 7.5; 2 MG/1; MG/1
TABLET, FILM COATED ORAL
Qty: 90 TABLET | Refills: 0 | Status: SHIPPED | OUTPATIENT
Start: 2018-04-23 | End: 2018-05-22

## 2018-04-24 DIAGNOSIS — F41.1 GENERALIZED ANXIETY DISORDER: ICD-10-CM

## 2018-04-25 RX ORDER — DIAZEPAM 10 MG/1
TABLET ORAL
Qty: 60 TABLET | Refills: 1 | Status: SHIPPED | OUTPATIENT
Start: 2018-04-25 | End: 2018-06-27

## 2018-05-22 DIAGNOSIS — M47.816 OSTEOARTHRITIS OF LUMBAR SPINE, UNSPECIFIED SPINAL OSTEOARTHRITIS COMPLICATION STATUS: ICD-10-CM

## 2018-05-22 DIAGNOSIS — M47.812 DJD (DEGENERATIVE JOINT DISEASE), CERVICAL: ICD-10-CM

## 2018-05-22 RX ORDER — HYDROCODONE BITARTRATE AND IBUPROFEN 7.5; 2 MG/1; MG/1
TABLET, FILM COATED ORAL
Qty: 90 TABLET | Refills: 0 | Status: SHIPPED | OUTPATIENT
Start: 2018-05-22 | End: 2018-06-20

## 2018-05-31 ENCOUNTER — PATIENT OUTREACH (OUTPATIENT)
Dept: FAMILY MEDICINE CLINIC | Facility: CLINIC | Age: 73
End: 2018-05-31

## 2018-05-31 NOTE — PROGRESS NOTES
Please call pt to inquire if pt has had a colonoscopy in the last 10 years and if so who performed it (name and phone #). Please let Taylor Nath know so I can obtain the report.     If pt did not have a colonoscopy please advise them we will leave the FIT stoo

## 2018-06-13 ENCOUNTER — OFFICE VISIT (OUTPATIENT)
Dept: FAMILY MEDICINE CLINIC | Facility: CLINIC | Age: 73
End: 2018-06-13

## 2018-06-13 VITALS
WEIGHT: 175 LBS | BODY MASS INDEX: 27.47 KG/M2 | SYSTOLIC BLOOD PRESSURE: 130 MMHG | TEMPERATURE: 97 F | DIASTOLIC BLOOD PRESSURE: 80 MMHG | RESPIRATION RATE: 12 BRPM | HEIGHT: 67 IN | HEART RATE: 68 BPM

## 2018-06-13 DIAGNOSIS — Z12.11 COLON CANCER SCREENING: ICD-10-CM

## 2018-06-13 DIAGNOSIS — I10 ESSENTIAL HYPERTENSION: Chronic | ICD-10-CM

## 2018-06-13 DIAGNOSIS — Z12.83 SKIN CANCER SCREENING: ICD-10-CM

## 2018-06-13 DIAGNOSIS — M47.812 DJD (DEGENERATIVE JOINT DISEASE), CERVICAL: Chronic | ICD-10-CM

## 2018-06-13 DIAGNOSIS — C44.111: Chronic | ICD-10-CM

## 2018-06-13 DIAGNOSIS — G89.29 CHRONIC NECK PAIN: ICD-10-CM

## 2018-06-13 DIAGNOSIS — Z00.00 ENCOUNTER FOR MEDICARE ANNUAL WELLNESS EXAM: Primary | ICD-10-CM

## 2018-06-13 DIAGNOSIS — M54.2 CHRONIC NECK PAIN: ICD-10-CM

## 2018-06-13 DIAGNOSIS — Z12.5 PROSTATE CANCER SCREENING: ICD-10-CM

## 2018-06-13 DIAGNOSIS — M47.816 OSTEOARTHRITIS OF LUMBAR SPINE, UNSPECIFIED SPINAL OSTEOARTHRITIS COMPLICATION STATUS: Chronic | ICD-10-CM

## 2018-06-13 DIAGNOSIS — Z85.89 HISTORY OF HEAD AND NECK CANCER: Chronic | ICD-10-CM

## 2018-06-13 DIAGNOSIS — H91.90 HEARING LOSS, UNSPECIFIED HEARING LOSS TYPE, UNSPECIFIED LATERALITY: Chronic | ICD-10-CM

## 2018-06-13 PROCEDURE — 96160 PT-FOCUSED HLTH RISK ASSMT: CPT | Performed by: FAMILY MEDICINE

## 2018-06-13 PROCEDURE — G0439 PPPS, SUBSEQ VISIT: HCPCS | Performed by: FAMILY MEDICINE

## 2018-06-13 NOTE — PROGRESS NOTES
HPI:   Roro Spann is a 74year old male who presents for a MA (Medicare Advantage) Supervisit (Once per calendar year). Patient has no complaints or concerns today. Denies any recent illnesses or hospitalizations.   Prevnar and pneumovax up to renee Take 1 tablet (50 mg total) by mouth once daily. MEDICAL INFORMATION:   He  has a past medical history of Basal cell carcinoma, eyelid (12/2/2013); Chronic back pain (12/2/2013); D[facility] ([facility] venous thrombosis) ([facility] (12/2/2013);  Head and neck cancer ( 7/27/2017  1:11 PM  Screening Method:  Finger Rub  Finger Rub Result:  Fail (Comment: RIGHT EAR HEARING AIDE)           Visual Acuity  Right Eye Visual Acuity: Corrected Right Eye Chart Acuity: 20/40   Left Eye Visual Acuity: Corrected Left Eye Chart Acuit orders for this visit:    Routine general medical examination at a [facility] facility  -  prevnar and pneumovax up to date  -  Refuses colonoscopy - cologuard ordered    DJD (degenerative joint disease), cervical  -  Stable  -  Continue hydrocodone PRN file in [address] [place] [address] Rd. Discussed with patient and provided information           PLAN:  The patient indicates understanding of these issues and agrees to the plan. No Follow-up on file.      [address] [facility], , 7/27/2017       General Health     In the past six Assessment\" under Medicare Assessment section in Charting, test patient and document. Then, refresh your progress note to see your input here.   Cognitive Assessment     What day of the week is this?: Correct    What month is it?: Correct    What year i Hepatitis B No orders found for this or any previous visit. Tetanus No orders found for this or any previous visit.          SPECIFIC DISEASE MONITORING Internal Lab or Procedure External Lab or Procedure   Annual Monitoring of Persistent     Medication

## 2018-06-13 NOTE — PATIENT INSTRUCTIONS
Nusrat Brush's SCREENING SCHEDULE   Tests on this list are recommended by your physician but may not be covered, or covered at this frequency, by your insurer. Please check with your insurance carrier before scheduling to verify coverage.     PREVENTATI Colonoscopy Screen   Covered every 10 years- more often if abnormal Colonoscopy,10 Years due on 10/10/1993 Update Health Maintenance if applicable    Flex Sigmoidoscopy Screen  Covered every 5 years No results found for this or any previous visit.  No flows found for this or any previous visit. This may be covered with your prescription benefits, but Medicare does not cover unless Medically needed    Zoster (Not covered by Medicare Part B) No orders found for this or any previous visit.  This may be covered wi

## 2018-06-20 DIAGNOSIS — M47.816 OSTEOARTHRITIS OF LUMBAR SPINE, UNSPECIFIED SPINAL OSTEOARTHRITIS COMPLICATION STATUS: ICD-10-CM

## 2018-06-20 DIAGNOSIS — M47.812 DJD (DEGENERATIVE JOINT DISEASE), CERVICAL: ICD-10-CM

## 2018-06-20 RX ORDER — HYDROCODONE BITARTRATE AND IBUPROFEN 7.5; 2 MG/1; MG/1
TABLET, FILM COATED ORAL
Qty: 90 TABLET | Refills: 0 | Status: SHIPPED | OUTPATIENT
Start: 2018-06-20 | End: 2018-07-18

## 2018-06-20 NOTE — TELEPHONE ENCOUNTER
Pt would like refill of hydrocodone-ibuprofen 7.5-200 MG Oral Tab He will . Advised pt script would be ready Monday. He requested to get a phone call if it's ready before Monday. Thank you.

## 2018-06-27 DIAGNOSIS — F41.1 GENERALIZED ANXIETY DISORDER: ICD-10-CM

## 2018-06-28 RX ORDER — DIAZEPAM 10 MG/1
TABLET ORAL
Qty: 60 TABLET | Refills: 2 | Status: SHIPPED | OUTPATIENT
Start: 2018-06-28 | End: 2018-11-12

## 2018-07-18 DIAGNOSIS — M47.812 DJD (DEGENERATIVE JOINT DISEASE), CERVICAL: ICD-10-CM

## 2018-07-18 DIAGNOSIS — M47.816 OSTEOARTHRITIS OF LUMBAR SPINE, UNSPECIFIED SPINAL OSTEOARTHRITIS COMPLICATION STATUS: ICD-10-CM

## 2018-07-18 DIAGNOSIS — I10 ESSENTIAL HYPERTENSION WITH GOAL BLOOD PRESSURE LESS THAN 140/90: ICD-10-CM

## 2018-07-18 RX ORDER — METOPROLOL SUCCINATE 50 MG/1
50 TABLET, EXTENDED RELEASE ORAL
Qty: 90 TABLET | Refills: 0 | Status: SHIPPED | OUTPATIENT
Start: 2018-07-18 | End: 2018-10-22

## 2018-07-18 RX ORDER — HYDROCODONE BITARTRATE AND IBUPROFEN 7.5; 2 MG/1; MG/1
TABLET, FILM COATED ORAL
Qty: 90 TABLET | Refills: 0 | Status: SHIPPED | OUTPATIENT
Start: 2018-07-18 | End: 2018-08-14

## 2018-07-18 NOTE — TELEPHONE ENCOUNTER
Pt is asking for refill of hydrocodone. Pt will p/u. Also please send refill of Metoprolol Succinate ER 50 MG Oral Tablet 24 Hr to [facility]; pt called [facility] to request but have not received.

## 2018-08-14 DIAGNOSIS — M47.812 DJD (DEGENERATIVE JOINT DISEASE), CERVICAL: ICD-10-CM

## 2018-08-14 DIAGNOSIS — M47.816 OSTEOARTHRITIS OF LUMBAR SPINE, UNSPECIFIED SPINAL OSTEOARTHRITIS COMPLICATION STATUS: ICD-10-CM

## 2018-08-14 RX ORDER — HYDROCODONE BITARTRATE AND IBUPROFEN 7.5; 2 MG/1; MG/1
TABLET, FILM COATED ORAL
Qty: 90 TABLET | Refills: 0 | Status: SHIPPED | OUTPATIENT
Start: 2018-08-19 | End: 2018-09-14

## 2018-08-16 DIAGNOSIS — I10 ESSENTIAL HYPERTENSION WITH GOAL BLOOD PRESSURE LESS THAN 140/90: ICD-10-CM

## 2018-08-17 RX ORDER — AMLODIPINE BESYLATE 10 MG/1
TABLET ORAL
Qty: 90 TABLET | Refills: 0 | Status: SHIPPED | OUTPATIENT
Start: 2018-08-17 | End: 2018-12-06

## 2018-09-12 ENCOUNTER — TELEPHONE (OUTPATIENT)
Dept: FAMILY MEDICINE CLINIC | Facility: CLINIC | Age: 73
End: 2018-09-12

## 2018-09-12 NOTE — TELEPHONE ENCOUNTER
Pt needs appt for narcotic refill. Per Dr Cesar Magana policy, needs to be seen in the office every 3 months when managing narcotics. Please have him make appt so we can send to Dr Cesar Magana to authorize.  Unless pt can get in the next day or two,  can fill at that t

## 2018-09-12 NOTE — TELEPHONE ENCOUNTER
Pt is calling and requesting a refill on hydrocodone-ibuprofen 7.5-200 MG Oral Tab. Pt only has 6 pills left.

## 2018-09-14 ENCOUNTER — OFFICE VISIT (OUTPATIENT)
Dept: FAMILY MEDICINE CLINIC | Facility: CLINIC | Age: 73
End: 2018-09-14

## 2018-09-14 ENCOUNTER — APPOINTMENT (OUTPATIENT)
Dept: LAB | Age: 73
End: 2018-09-14
Attending: FAMILY MEDICINE
Payer: MEDICARE

## 2018-09-14 VITALS
SYSTOLIC BLOOD PRESSURE: 130 MMHG | TEMPERATURE: 99 F | RESPIRATION RATE: 12 BRPM | HEART RATE: 90 BPM | BODY MASS INDEX: 27.15 KG/M2 | WEIGHT: 173 LBS | DIASTOLIC BLOOD PRESSURE: 84 MMHG | HEIGHT: 67 IN

## 2018-09-14 DIAGNOSIS — Z12.5 PROSTATE CANCER SCREENING: ICD-10-CM

## 2018-09-14 DIAGNOSIS — I10 ESSENTIAL HYPERTENSION: Chronic | ICD-10-CM

## 2018-09-14 DIAGNOSIS — M47.816 OSTEOARTHRITIS OF LUMBAR SPINE, UNSPECIFIED SPINAL OSTEOARTHRITIS COMPLICATION STATUS: ICD-10-CM

## 2018-09-14 DIAGNOSIS — M47.812 DJD (DEGENERATIVE JOINT DISEASE), CERVICAL: Primary | ICD-10-CM

## 2018-09-14 DIAGNOSIS — F41.1 GENERALIZED ANXIETY DISORDER: ICD-10-CM

## 2018-09-14 DIAGNOSIS — I10 ESSENTIAL HYPERTENSION WITH GOAL BLOOD PRESSURE LESS THAN 140/90: ICD-10-CM

## 2018-09-14 LAB
ALBUMIN SERPL-MCNC: 3.7 G/DL (ref 3.5–4.8)
ALBUMIN/GLOB SERPL: 0.8 {RATIO} (ref 1–2)
ALP LIVER SERPL-CCNC: 84 U/L (ref 45–117)
ALT SERPL-CCNC: 22 U/L (ref 17–63)
ANION GAP SERPL CALC-SCNC: 8 MMOL/L (ref 0–18)
AST SERPL-CCNC: 12 U/L (ref 15–41)
BILIRUB SERPL-MCNC: 0.5 MG/DL (ref 0.1–2)
BUN BLD-MCNC: 10 MG/DL (ref 8–20)
BUN/CREAT SERPL: 9.3 (ref 10–20)
CALCIUM BLD-MCNC: 9 MG/DL (ref 8.3–10.3)
CHLORIDE SERPL-SCNC: 100 MMOL/L (ref 101–111)
CHOLEST SMN-MCNC: 164 MG/DL (ref ?–200)
CO2 SERPL-SCNC: 27 MMOL/L (ref 22–32)
COMPLEXED PSA SERPL-MCNC: 2.62 NG/ML (ref 0.01–4)
CREAT BLD-MCNC: 1.08 MG/DL (ref 0.7–1.3)
GLOBULIN PLAS-MCNC: 4.5 G/DL (ref 2.5–4)
GLUCOSE BLD-MCNC: 92 MG/DL (ref 70–99)
HDLC SERPL-MCNC: 42 MG/DL (ref 40–59)
LDLC SERPL CALC-MCNC: 98 MG/DL (ref ?–100)
M PROTEIN MFR SERPL ELPH: 8.2 G/DL (ref 6.1–8.3)
NONHDLC SERPL-MCNC: 122 MG/DL (ref ?–130)
OSMOLALITY SERPL CALC.SUM OF ELEC: 279 MOSM/KG (ref 275–295)
POTASSIUM SERPL-SCNC: 4 MMOL/L (ref 3.6–5.1)
SODIUM SERPL-SCNC: 135 MMOL/L (ref 136–144)
TRIGL SERPL-MCNC: 122 MG/DL (ref 30–149)
VLDLC SERPL CALC-MCNC: 24 MG/DL (ref 0–30)

## 2018-09-14 PROCEDURE — 99214 OFFICE O/P EST MOD 30 MIN: CPT | Performed by: FAMILY MEDICINE

## 2018-09-14 PROCEDURE — 80061 LIPID PANEL: CPT

## 2018-09-14 PROCEDURE — 36415 COLL VENOUS BLD VENIPUNCTURE: CPT

## 2018-09-14 PROCEDURE — 80053 COMPREHEN METABOLIC PANEL: CPT

## 2018-09-14 RX ORDER — HYDROCODONE BITARTRATE AND IBUPROFEN 7.5; 2 MG/1; MG/1
TABLET, FILM COATED ORAL
Qty: 90 TABLET | Refills: 0 | Status: SHIPPED | OUTPATIENT
Start: 2018-09-14 | End: 2018-10-10

## 2018-09-14 NOTE — PROGRESS NOTES
[facility] Family Medicine Office Note  Chief Complaint:   Patient presents with:  Medication Follow-Up: pain med      HPI:   This is a 74year old male coming in for follow-up of degenerative joint disease of the cervical spine as well as [place] BESYLATE 10 MG Oral Tab TAKE ONE TABLET BY MOUTH ONE TIME DAILY  Disp: 90 tablet Rfl: 0   Metoprolol Succinate ER 50 MG Oral Tablet 24 Hr Take 1 tablet (50 mg total) by mouth once daily.  Disp: 90 tablet Rfl: 0   DIAZEPAM 10 MG Oral Tab TAKE 1 TABLET BY GEORGETTE 7.5-200 MG Oral Tab; TAKE 1 TABLET BY MOUTH EVERY 8 HOURS AS NEEDED FOR PAIN  Dispense: 90 tablet; Refill: 0    2. Osteoarthritis of lumbar spine, unspecified spinal osteoarthritis complication status  -  Stable  -  Continue hydrocodone PRN    3.   HTN   -

## 2018-10-10 DIAGNOSIS — M47.812 DJD (DEGENERATIVE JOINT DISEASE), CERVICAL: ICD-10-CM

## 2018-10-10 DIAGNOSIS — M47.816 OSTEOARTHRITIS OF LUMBAR SPINE, UNSPECIFIED SPINAL OSTEOARTHRITIS COMPLICATION STATUS: ICD-10-CM

## 2018-10-10 DIAGNOSIS — I10 ESSENTIAL HYPERTENSION WITH GOAL BLOOD PRESSURE LESS THAN 140/90: ICD-10-CM

## 2018-10-10 RX ORDER — HYDROCODONE BITARTRATE AND IBUPROFEN 7.5; 2 MG/1; MG/1
TABLET, FILM COATED ORAL
Qty: 90 TABLET | Refills: 0 | Status: SHIPPED | OUTPATIENT
Start: 2018-10-10 | End: 2018-11-05

## 2018-10-10 RX ORDER — LOSARTAN POTASSIUM 100 MG/1
100 TABLET ORAL
Qty: 90 TABLET | Refills: 0 | Status: SHIPPED | OUTPATIENT
Start: 2018-10-10 | End: 2019-01-05

## 2018-10-10 NOTE — TELEPHONE ENCOUNTER
Vidalia told pt to call his PCP to get following refill ordered:    LOSARTAN 100 MG Oral Tab    Pt is also asking for a refill of:  hydrocodone-ibuprofen 7.5-200 MG Oral Tab    Pt will p/u the hydorcodone.

## 2018-10-10 NOTE — TELEPHONE ENCOUNTER
Pt asking for refill of this and losartan (losartan routed to protocol)  Last fill 9/14 #90 at his ov. Please approve if appropriate. Thanks.

## 2018-10-17 ENCOUNTER — TELEPHONE (OUTPATIENT)
Dept: FAMILY MEDICINE CLINIC | Facility: CLINIC | Age: 73
End: 2018-10-17

## 2018-10-17 NOTE — TELEPHONE ENCOUNTER
Please call pt back. He wants to know what he can take interim. Please call him back at [phone]. Number has been confirmed by pt.

## 2018-10-17 NOTE — TELEPHONE ENCOUNTER
I can appreciate him losing the medication but I cannot refill the medication early. I just filled it last week.

## 2018-10-17 NOTE — TELEPHONE ENCOUNTER
Pt requesting a refill of hydrocodone-ibuprofen 7.5-200 MG Oral Tab because he has lost the bottle of pills and can't find it. Please advise.

## 2018-10-22 DIAGNOSIS — I10 ESSENTIAL HYPERTENSION WITH GOAL BLOOD PRESSURE LESS THAN 140/90: ICD-10-CM

## 2018-10-24 RX ORDER — METOPROLOL SUCCINATE 50 MG/1
TABLET, EXTENDED RELEASE ORAL
Qty: 90 TABLET | Refills: 0 | Status: SHIPPED | OUTPATIENT
Start: 2018-10-24 | End: 2019-03-12

## 2018-11-05 DIAGNOSIS — M47.816 OSTEOARTHRITIS OF LUMBAR SPINE, UNSPECIFIED SPINAL OSTEOARTHRITIS COMPLICATION STATUS: ICD-10-CM

## 2018-11-05 DIAGNOSIS — M47.812 DJD (DEGENERATIVE JOINT DISEASE), CERVICAL: ICD-10-CM

## 2018-11-05 RX ORDER — HYDROCODONE BITARTRATE AND IBUPROFEN 7.5; 2 MG/1; MG/1
TABLET, FILM COATED ORAL
Qty: 90 TABLET | Refills: 0 | Status: SHIPPED | OUTPATIENT
Start: 2018-11-05 | End: 2018-12-10

## 2018-11-05 NOTE — TELEPHONE ENCOUNTER
LOV 09/14/18. Last refill of hydrocodone -ibuprofen 7.5-200 mg 1 every 8 hours as needed # 90 was on 10/10/18. Please authorize if appropriate.

## 2018-11-12 DIAGNOSIS — F41.1 GENERALIZED ANXIETY DISORDER: ICD-10-CM

## 2018-11-12 RX ORDER — DIAZEPAM 10 MG/1
TABLET ORAL
Qty: 60 TABLET | Refills: 0 | Status: SHIPPED | OUTPATIENT
Start: 2018-11-12 | End: 2018-12-10

## 2018-11-12 RX ORDER — DIAZEPAM 10 MG/1
TABLET ORAL
Qty: 60 TABLET | Refills: 2 | OUTPATIENT
Start: 2018-11-12

## 2018-12-01 NOTE — TELEPHONE ENCOUNTER
Pt seen 2 weeks ago for Medicare annual, last refill 4/25/18 #60 with one refill. Ventricular Rate : 77   Atrial Rate : 77   P-R Interval : 158   QRS Duration : 112   Q-T Interval : 394   QTC Calculation(Bezet) : 445   P Axis : 48   R Axis : -40   T Axis : 27   Diagnosis : Normal sinus rhythm~Left axis deviation Left Anterior Fasicular Block~Right bundle branch block~*** Bifascicular block ***~****Abnormal ECG****~When compared with ECG of 10-AUG-2018 23:56,~No significant change was found~Confirmed by MADDY MEDINA, MELVIN ([id number]) on 8/16/2018 12:49:44 PM

## 2018-12-06 DIAGNOSIS — I10 ESSENTIAL HYPERTENSION WITH GOAL BLOOD PRESSURE LESS THAN 140/90: ICD-10-CM

## 2018-12-06 RX ORDER — AMLODIPINE BESYLATE 10 MG/1
TABLET ORAL
Qty: 90 TABLET | Refills: 0 | Status: SHIPPED | OUTPATIENT
Start: 2018-12-06 | End: 2019-03-12

## 2018-12-10 DIAGNOSIS — M47.812 DJD (DEGENERATIVE JOINT DISEASE), CERVICAL: ICD-10-CM

## 2018-12-10 DIAGNOSIS — M47.816 OSTEOARTHRITIS OF LUMBAR SPINE, UNSPECIFIED SPINAL OSTEOARTHRITIS COMPLICATION STATUS: ICD-10-CM

## 2018-12-10 RX ORDER — HYDROCODONE BITARTRATE AND IBUPROFEN 7.5; 2 MG/1; MG/1
TABLET, FILM COATED ORAL
Qty: 90 TABLET | Refills: 0 | Status: SHIPPED | OUTPATIENT
Start: 2018-12-10 | End: 2019-01-08

## 2018-12-10 RX ORDER — DIAZEPAM 10 MG/1
TABLET ORAL
Qty: 60 TABLET | Refills: 0 | Status: SHIPPED | OUTPATIENT
Start: 2018-12-10 | End: 2019-01-15

## 2018-12-10 NOTE — TELEPHONE ENCOUNTER
Pt called and needs a refill on hydrocodone-ibuprofen 7.5-200 MG Oral Tab. He only has 3-4 pills left.

## 2018-12-10 NOTE — TELEPHONE ENCOUNTER
**also see other phone call today-pt requesting norco refill-last OV 9/14/18-per dr milsl's narcotic refill protocol, needs OV by 12/14/18**    Per note below-pt also requesting diazepam refill-also stating pharmacy never got order from 11/12/18.   Record sh

## 2018-12-10 NOTE — TELEPHONE ENCOUNTER
See below.   Seeing you 12/12  Are you ok in refilling in meantime or do you prefer to address at vs.

## 2018-12-10 NOTE — TELEPHONE ENCOUNTER
Patient due for Med visit, information given to patient, voiced understanding, states he will call back to schedule an appointment.

## 2018-12-10 NOTE — TELEPHONE ENCOUNTER
**also see other refill encounter from 11/12/18 w new call info from today-pt req for diazepam refill. Mult pharmacies in record. Sarah/tisha confirms pt picked up 11/12/18 order for diazepam.**    Last med visit for DJD. osteoarthritis lumbar spine.  HTN, a

## 2018-12-10 NOTE — TELEPHONE ENCOUNTER
Pt is inquiring about the Diazepam 10mg oral tab refill. He said the pharmacy hasn't received it yet. Please call and advise.

## 2018-12-12 ENCOUNTER — OFFICE VISIT (OUTPATIENT)
Dept: FAMILY MEDICINE CLINIC | Facility: CLINIC | Age: 73
End: 2018-12-12

## 2018-12-12 VITALS
RESPIRATION RATE: 12 BRPM | HEIGHT: 67 IN | HEART RATE: 76 BPM | DIASTOLIC BLOOD PRESSURE: 80 MMHG | SYSTOLIC BLOOD PRESSURE: 120 MMHG | TEMPERATURE: 97 F | BODY MASS INDEX: 26.21 KG/M2 | WEIGHT: 167 LBS

## 2018-12-12 DIAGNOSIS — I10 ESSENTIAL HYPERTENSION WITH GOAL BLOOD PRESSURE LESS THAN 140/90: Primary | ICD-10-CM

## 2018-12-12 DIAGNOSIS — M47.22 OSTEOARTHRITIS OF SPINE WITH RADICULOPATHY, CERVICAL REGION: ICD-10-CM

## 2018-12-12 DIAGNOSIS — F41.1 GENERALIZED ANXIETY DISORDER: ICD-10-CM

## 2018-12-12 PROCEDURE — 99214 OFFICE O/P EST MOD 30 MIN: CPT | Performed by: FAMILY MEDICINE

## 2018-12-12 PROCEDURE — G0008 ADMIN INFLUENZA VIRUS VAC: HCPCS | Performed by: FAMILY MEDICINE

## 2018-12-12 PROCEDURE — 90653 IIV ADJUVANT VACCINE IM: CPT | Performed by: FAMILY MEDICINE

## 2018-12-12 NOTE — PROGRESS NOTES
[facility] Family Medicine Office Note  Chief Complaint:   Patient presents with:  Medication Follow-Up: HTN, pain med      HPI:   This is a 74year old male coming in for follow-up of degenerative joint disease of the cervical spine as well as o tablet Rfl: 0   diazepam 10 MG Oral Tab TAKE 1 TABLET BY MOUTH EVERY TWELVE HOURS AS NEEDED FOR ANXIETY Disp: 60 tablet Rfl: 0   AMLODIPINE BESYLATE 10 MG Oral Tab TAKE ONE TABLET BY MOUTH ONE TIME DAILY  Disp: 90 tablet Rfl: 0   METOPROLOL SUCCINATE ER 50 noted    2. HTN   -  Controlled  -  Check renal function in 3 months  -  Monitor BP at home  -  Continue present mgmt  -  F/u in 3 months    3.   Generalized anxiety disorder  -  Stable  -  Continue diazepam PRN      Meds & Refills for this Visit:  Requeste

## 2019-01-05 DIAGNOSIS — I10 ESSENTIAL HYPERTENSION WITH GOAL BLOOD PRESSURE LESS THAN 140/90: ICD-10-CM

## 2019-01-07 DIAGNOSIS — I10 ESSENTIAL HYPERTENSION WITH GOAL BLOOD PRESSURE LESS THAN 140/90: ICD-10-CM

## 2019-01-07 RX ORDER — LOSARTAN POTASSIUM 100 MG/1
TABLET ORAL
Qty: 90 TABLET | Refills: 0 | Status: SHIPPED | OUTPATIENT
Start: 2019-01-07 | End: 2019-03-14

## 2019-01-07 RX ORDER — LOSARTAN POTASSIUM 100 MG/1
TABLET ORAL
Qty: 90 TABLET | Refills: 0 | OUTPATIENT
Start: 2019-01-07

## 2019-01-08 ENCOUNTER — TELEPHONE (OUTPATIENT)
Dept: FAMILY MEDICINE CLINIC | Facility: CLINIC | Age: 74
End: 2019-01-08

## 2019-01-08 DIAGNOSIS — M47.812 DJD (DEGENERATIVE JOINT DISEASE), CERVICAL: ICD-10-CM

## 2019-01-08 DIAGNOSIS — M47.816 OSTEOARTHRITIS OF LUMBAR SPINE, UNSPECIFIED SPINAL OSTEOARTHRITIS COMPLICATION STATUS: ICD-10-CM

## 2019-01-08 RX ORDER — HYDROCODONE BITARTRATE AND IBUPROFEN 7.5; 2 MG/1; MG/1
TABLET, FILM COATED ORAL
Qty: 90 TABLET | Refills: 0 | Status: SHIPPED | OUTPATIENT
Start: 2019-01-08 | End: 2019-02-12

## 2019-01-08 NOTE — TELEPHONE ENCOUNTER
Non protocol medication. Last refill 12/10/18. Last appointment 12/12/18.   Please approve if appropriate, thank you

## 2019-01-15 DIAGNOSIS — F41.1 GENERALIZED ANXIETY DISORDER: ICD-10-CM

## 2019-01-15 RX ORDER — DIAZEPAM 10 MG/1
TABLET ORAL
Qty: 60 TABLET | Refills: 1 | Status: SHIPPED | OUTPATIENT
Start: 2019-01-15 | End: 2019-03-14

## 2019-01-15 NOTE — TELEPHONE ENCOUNTER
[facility] called. Pt is requesting a refill for Diazepam 10 mg si tablet by mouth every 12 hours # 60 0 refills. Last refill was 12/10/18. LOV was 18.  Please authorize if appropriate

## 2019-02-12 DIAGNOSIS — M47.812 DJD (DEGENERATIVE JOINT DISEASE), CERVICAL: ICD-10-CM

## 2019-02-12 DIAGNOSIS — M47.816 OSTEOARTHRITIS OF LUMBAR SPINE, UNSPECIFIED SPINAL OSTEOARTHRITIS COMPLICATION STATUS: ICD-10-CM

## 2019-02-12 NOTE — TELEPHONE ENCOUNTER
Pt would like refill of HYDROcodone-ibuprofen 7.5-200 MG Oral Tab. Pt or son, Hien Valladares will .

## 2019-02-12 NOTE — TELEPHONE ENCOUNTER
Last visit 12/12/19. Last refill of #90 1/8/19. I'm guessing you wish to see in order to get this type of Rx, was patient to be seen in 3 months (March?) or come in now to get refill, or Rx now and see in March?

## 2019-02-13 RX ORDER — HYDROCODONE BITARTRATE AND IBUPROFEN 7.5; 2 MG/1; MG/1
TABLET, FILM COATED ORAL
Qty: 90 TABLET | Refills: 0 | Status: SHIPPED | OUTPATIENT
Start: 2019-02-13 | End: 2019-03-14

## 2019-02-27 ENCOUNTER — PATIENT OUTREACH (OUTPATIENT)
Dept: FAMILY MEDICINE CLINIC | Facility: CLINIC | Age: 74
End: 2019-02-27

## 2019-02-27 NOTE — PROGRESS NOTES
Please call pt to inquire if pt has had a colonoscopy in the last 10 years and if so who performed it (name and phone #). Please let Villalba Ards know so I can obtain the report.     If pt did not have a colonoscopy please advise them we will leave the FIT stoo

## 2019-03-08 DIAGNOSIS — I10 ESSENTIAL HYPERTENSION WITH GOAL BLOOD PRESSURE LESS THAN 140/90: ICD-10-CM

## 2019-03-11 NOTE — TELEPHONE ENCOUNTER
Last OV 12/12/18 w recommendation to follow up in 3 months. No appts scheduled. NEEDS MED VISIT  Losartan last filled 1/7/19 #90 no RF. call to pharmacy confirms pt picked up that rx-should have enough until early April.    Metoprolol last ordered 10/28/18

## 2019-03-12 RX ORDER — AMLODIPINE BESYLATE 10 MG/1
10 TABLET ORAL
Qty: 30 TABLET | Refills: 0 | Status: SHIPPED | OUTPATIENT
Start: 2019-03-12 | End: 2019-03-14

## 2019-03-12 RX ORDER — METOPROLOL SUCCINATE 50 MG/1
50 TABLET, EXTENDED RELEASE ORAL
Qty: 30 TABLET | Refills: 0 | Status: SHIPPED | OUTPATIENT
Start: 2019-03-12 | End: 2019-03-14

## 2019-03-13 ENCOUNTER — TELEPHONE (OUTPATIENT)
Dept: FAMILY MEDICINE CLINIC | Facility: CLINIC | Age: 74
End: 2019-03-13

## 2019-03-13 NOTE — TELEPHONE ENCOUNTER
Spoke with Patient he still has Gabriel Jenkins Collins 77 patient we do not take this insurance. Explained he would have to call the back of his card.

## 2019-03-14 PROBLEM — Z85.828 HISTORY OF BASAL CELL CANCER: Status: ACTIVE | Noted: 2019-03-14

## 2019-11-25 ENCOUNTER — HOSPITAL ENCOUNTER (INPATIENT)
Facility: HOSPITAL | Age: 74
LOS: 6 days | Discharge: HOME HEALTH | DRG: 871 | End: 2019-12-02
Attending: EMERGENCY MEDICINE | Admitting: HOSPITALIST
Payer: MEDICARE

## 2019-11-25 ENCOUNTER — APPOINTMENT (OUTPATIENT)
Dept: GENERAL RADIOLOGY | Facility: HOSPITAL | Age: 74
DRG: 871 | End: 2019-11-25
Payer: MEDICARE

## 2019-11-25 ENCOUNTER — APPOINTMENT (OUTPATIENT)
Dept: CT IMAGING | Facility: HOSPITAL | Age: 74
DRG: 871 | End: 2019-11-25
Attending: EMERGENCY MEDICINE
Payer: MEDICARE

## 2019-11-25 DIAGNOSIS — R40.0 SOMNOLENCE: Primary | ICD-10-CM

## 2019-11-25 DIAGNOSIS — J18.9 PNEUMONIA OF LEFT UPPER LOBE DUE TO INFECTIOUS ORGANISM: ICD-10-CM

## 2019-11-25 LAB
ADENOVIRUS PCR:: NEGATIVE
ALBUMIN SERPL-MCNC: 2.8 G/DL (ref 3.4–5)
ALBUMIN/GLOB SERPL: 0.6 {RATIO} (ref 1–2)
ALP LIVER SERPL-CCNC: 71 U/L (ref 45–117)
ALT SERPL-CCNC: 22 U/L (ref 16–61)
ANION GAP SERPL CALC-SCNC: 9 MMOL/L (ref 0–18)
AST SERPL-CCNC: 36 U/L (ref 15–37)
B PERT DNA SPEC QL NAA+PROBE: NEGATIVE
BASOPHILS # BLD AUTO: 0.01 X10(3) UL (ref 0–0.2)
BASOPHILS NFR BLD AUTO: 0.1 %
BILIRUB SERPL-MCNC: 0.9 MG/DL (ref 0.1–2)
BILIRUB UR QL STRIP.AUTO: NEGATIVE
BUN BLD-MCNC: 26 MG/DL (ref 7–18)
BUN/CREAT SERPL: 22.2 (ref 10–20)
C PNEUM DNA SPEC QL NAA+PROBE: NEGATIVE
CALCIUM BLD-MCNC: 9 MG/DL (ref 8.5–10.1)
CHLORIDE SERPL-SCNC: 109 MMOL/L (ref 98–112)
CK SERPL-CCNC: 119 U/L (ref 39–308)
CLARITY UR REFRACT.AUTO: CLEAR
CO2 SERPL-SCNC: 24 MMOL/L (ref 21–32)
COLOR UR AUTO: YELLOW
CORONAVIRUS 229E PCR:: NEGATIVE
CORONAVIRUS HKU1 PCR:: NEGATIVE
CORONAVIRUS NL63 PCR:: NEGATIVE
CORONAVIRUS OC43 PCR:: NEGATIVE
CREAT BLD-MCNC: 1.17 MG/DL (ref 0.7–1.3)
DEPRECATED RDW RBC AUTO: 42.5 FL (ref 35.1–46.3)
EOSINOPHIL # BLD AUTO: 0 X10(3) UL (ref 0–0.7)
EOSINOPHIL NFR BLD AUTO: 0 %
ERYTHROCYTE [DISTWIDTH] IN BLOOD BY AUTOMATED COUNT: 12.3 % (ref 11–15)
FLUAV RNA SPEC QL NAA+PROBE: NEGATIVE
FLUBV RNA SPEC QL NAA+PROBE: NEGATIVE
GLOBULIN PLAS-MCNC: 4.4 G/DL (ref 2.8–4.4)
GLUCOSE BLD-MCNC: 143 MG/DL (ref 70–99)
GLUCOSE UR STRIP.AUTO-MCNC: NEGATIVE MG/DL
HCT VFR BLD AUTO: 34.9 % (ref 39–53)
HGB BLD-MCNC: 12 G/DL (ref 13–17.5)
HYALINE CASTS #/AREA URNS AUTO: PRESENT /LPF
IMM GRANULOCYTES # BLD AUTO: 0.08 X10(3) UL (ref 0–1)
IMM GRANULOCYTES NFR BLD: 0.7 %
KETONES UR STRIP.AUTO-MCNC: 80 MG/DL
LACTATE SERPL-SCNC: 1.7 MMOL/L (ref 0.4–2)
LEUKOCYTE ESTERASE UR QL STRIP.AUTO: NEGATIVE
LYMPHOCYTES # BLD AUTO: 0.76 X10(3) UL (ref 1–4)
LYMPHOCYTES NFR BLD AUTO: 6.7 %
M PROTEIN MFR SERPL ELPH: 7.2 G/DL (ref 6.4–8.2)
MCH RBC QN AUTO: 32.4 PG (ref 26–34)
MCHC RBC AUTO-ENTMCNC: 34.4 G/DL (ref 31–37)
MCV RBC AUTO: 94.3 FL (ref 80–100)
METAPNEUMOVIRUS PCR:: NEGATIVE
MONOCYTES # BLD AUTO: 0.89 X10(3) UL (ref 0.1–1)
MONOCYTES NFR BLD AUTO: 7.8 %
MYCOPLASMA PNEUMONIA PCR:: NEGATIVE
NEUTROPHILS # BLD AUTO: 9.61 X10 (3) UL (ref 1.5–7.7)
NEUTROPHILS # BLD AUTO: 9.61 X10(3) UL (ref 1.5–7.7)
NEUTROPHILS NFR BLD AUTO: 84.7 %
NITRITE UR QL STRIP.AUTO: NEGATIVE
OSMOLALITY SERPL CALC.SUM OF ELEC: 301 MOSM/KG (ref 275–295)
PARAINFLUENZA 1 PCR:: NEGATIVE
PARAINFLUENZA 2 PCR:: NEGATIVE
PARAINFLUENZA 3 PCR:: NEGATIVE
PARAINFLUENZA 4 PCR:: NEGATIVE
PH UR STRIP.AUTO: 6 [PH] (ref 4.5–8)
PLATELET # BLD AUTO: 267 10(3)UL (ref 150–450)
POTASSIUM SERPL-SCNC: 3.4 MMOL/L (ref 3.5–5.1)
PROT UR STRIP.AUTO-MCNC: NEGATIVE MG/DL
RBC # BLD AUTO: 3.7 X10(6)UL (ref 3.8–5.8)
RHINOVIRUS/ENTERO PCR:: NEGATIVE
RSV RNA SPEC QL NAA+PROBE: NEGATIVE
SODIUM SERPL-SCNC: 142 MMOL/L (ref 136–145)
SP GR UR STRIP.AUTO: 1.02 (ref 1–1.03)
UROBILINOGEN UR STRIP.AUTO-MCNC: 2 MG/DL
WBC # BLD AUTO: 11.4 X10(3) UL (ref 4–11)

## 2019-11-25 PROCEDURE — 70450 CT HEAD/BRAIN W/O DYE: CPT | Performed by: EMERGENCY MEDICINE

## 2019-11-25 PROCEDURE — 71045 X-RAY EXAM CHEST 1 VIEW: CPT

## 2019-11-25 RX ORDER — LIDOCAINE HYDROCHLORIDE 20 MG/ML
10 JELLY TOPICAL ONCE
Status: COMPLETED | OUTPATIENT
Start: 2019-11-25 | End: 2019-11-25

## 2019-11-26 ENCOUNTER — APPOINTMENT (OUTPATIENT)
Dept: GENERAL RADIOLOGY | Facility: HOSPITAL | Age: 74
DRG: 871 | End: 2019-11-26
Attending: NURSE PRACTITIONER
Payer: MEDICARE

## 2019-11-26 PROBLEM — J18.9 PNEUMONIA OF LEFT UPPER LOBE DUE TO INFECTIOUS ORGANISM: Status: ACTIVE | Noted: 2019-11-26

## 2019-11-26 LAB
AMMONIA PLAS-MCNC: 54 UMOL/L (ref 11–32)
ANION GAP SERPL CALC-SCNC: 6 MMOL/L (ref 0–18)
APTT PPP: 32.3 SECONDS (ref 25.4–36.1)
ATRIAL RATE: 106 BPM
BASOPHILS # BLD AUTO: 0.02 X10(3) UL (ref 0–0.2)
BASOPHILS NFR BLD AUTO: 0.2 %
BUN BLD-MCNC: 23 MG/DL (ref 7–18)
BUN/CREAT SERPL: 26.4 (ref 10–20)
CALCIUM BLD-MCNC: 8 MG/DL (ref 8.5–10.1)
CHLORIDE SERPL-SCNC: 114 MMOL/L (ref 98–112)
CO2 SERPL-SCNC: 24 MMOL/L (ref 21–32)
CREAT BLD-MCNC: 0.87 MG/DL (ref 0.7–1.3)
DEPRECATED RDW RBC AUTO: 44.5 FL (ref 35.1–46.3)
EOSINOPHIL # BLD AUTO: 0.02 X10(3) UL (ref 0–0.7)
EOSINOPHIL NFR BLD AUTO: 0.2 %
ERYTHROCYTE [DISTWIDTH] IN BLOOD BY AUTOMATED COUNT: 12.5 % (ref 11–15)
ETHANOL SERPL-MCNC: <3 MG/DL (ref ?–3)
GLUCOSE BLD-MCNC: 119 MG/DL (ref 70–99)
HAV IGM SER QL: 2.1 MG/DL (ref 1.6–2.6)
HCT VFR BLD AUTO: 31.3 % (ref 39–53)
HGB BLD-MCNC: 10 G/DL (ref 13–17.5)
HGB BLD-MCNC: 10.7 G/DL (ref 13–17.5)
IMM GRANULOCYTES # BLD AUTO: 0.05 X10(3) UL (ref 0–1)
IMM GRANULOCYTES NFR BLD: 0.6 %
INR BLD: 1.47 (ref 0.9–1.1)
LYMPHOCYTES # BLD AUTO: 0.97 X10(3) UL (ref 1–4)
LYMPHOCYTES NFR BLD AUTO: 11 %
MCH RBC QN AUTO: 33.1 PG (ref 26–34)
MCHC RBC AUTO-ENTMCNC: 34.2 G/DL (ref 31–37)
MCV RBC AUTO: 96.9 FL (ref 80–100)
MONOCYTES # BLD AUTO: 0.88 X10(3) UL (ref 0.1–1)
MONOCYTES NFR BLD AUTO: 10 %
NEUTROPHILS # BLD AUTO: 6.9 X10 (3) UL (ref 1.5–7.7)
NEUTROPHILS # BLD AUTO: 6.9 X10(3) UL (ref 1.5–7.7)
NEUTROPHILS NFR BLD AUTO: 78 %
NT-PROBNP SERPL-MCNC: 2324 PG/ML (ref ?–450)
OSMOLALITY SERPL CALC.SUM OF ELEC: 303 MOSM/KG (ref 275–295)
P AXIS: 36 DEGREES
P-R INTERVAL: 164 MS
PHOSPHATE SERPL-MCNC: 1.4 MG/DL (ref 2.5–4.9)
PLATELET # BLD AUTO: 263 10(3)UL (ref 150–450)
POTASSIUM SERPL-SCNC: 3.2 MMOL/L (ref 3.5–5.1)
PROCALCITONIN SERPL-MCNC: 1.47 NG/ML
PSA SERPL DL<=0.01 NG/ML-MCNC: 18.6 SECONDS (ref 12.5–14.7)
Q-T INTERVAL: 304 MS
QRS DURATION: 94 MS
QTC CALCULATION (BEZET): 403 MS
R AXIS: 3 DEGREES
RBC # BLD AUTO: 3.23 X10(6)UL (ref 3.8–5.8)
SED RATE-ML: 70 MM/HR (ref 0–12)
SODIUM SERPL-SCNC: 144 MMOL/L (ref 136–145)
T AXIS: 22 DEGREES
TSI SER-ACNC: 1.47 MIU/ML (ref 0.36–3.74)
VENTRICULAR RATE: 106 BPM
VIT B12 SERPL-MCNC: 251 PG/ML (ref 193–986)
WBC # BLD AUTO: 8.8 X10(3) UL (ref 4–11)

## 2019-11-26 PROCEDURE — 99291 CRITICAL CARE FIRST HOUR: CPT | Performed by: NURSE PRACTITIONER

## 2019-11-26 PROCEDURE — 71045 X-RAY EXAM CHEST 1 VIEW: CPT | Performed by: NURSE PRACTITIONER

## 2019-11-26 PROCEDURE — 95819 EEG AWAKE AND ASLEEP: CPT | Performed by: OTHER

## 2019-11-26 RX ORDER — HEPARIN SODIUM 5000 [USP'U]/ML
5000 INJECTION, SOLUTION INTRAVENOUS; SUBCUTANEOUS EVERY 8 HOURS SCHEDULED
Status: DISCONTINUED | OUTPATIENT
Start: 2019-11-26 | End: 2019-11-26

## 2019-11-26 RX ORDER — CHLORPROMAZINE HYDROCHLORIDE 25 MG/1
25 TABLET, FILM COATED ORAL 3 TIMES DAILY PRN
Status: DISCONTINUED | OUTPATIENT
Start: 2019-11-26 | End: 2019-12-02

## 2019-11-26 RX ORDER — METOCLOPRAMIDE HYDROCHLORIDE 5 MG/ML
10 INJECTION INTRAMUSCULAR; INTRAVENOUS EVERY 8 HOURS PRN
Status: DISCONTINUED | OUTPATIENT
Start: 2019-11-26 | End: 2019-12-02

## 2019-11-26 RX ORDER — SODIUM CHLORIDE 9 MG/ML
INJECTION, SOLUTION INTRAVENOUS CONTINUOUS
Status: DISCONTINUED | OUTPATIENT
Start: 2019-11-26 | End: 2019-11-26

## 2019-11-26 RX ORDER — METOPROLOL SUCCINATE 50 MG/1
50 TABLET, EXTENDED RELEASE ORAL
Status: DISCONTINUED | OUTPATIENT
Start: 2019-11-27 | End: 2019-11-28

## 2019-11-26 RX ORDER — LACTULOSE 10 G/15ML
30 SOLUTION ORAL 3 TIMES DAILY
Status: DISCONTINUED | OUTPATIENT
Start: 2019-11-26 | End: 2019-11-26

## 2019-11-26 RX ORDER — ONDANSETRON 2 MG/ML
4 INJECTION INTRAMUSCULAR; INTRAVENOUS EVERY 6 HOURS PRN
Status: DISCONTINUED | OUTPATIENT
Start: 2019-11-26 | End: 2019-12-02

## 2019-11-26 RX ORDER — ACETAMINOPHEN 325 MG/1
650 TABLET ORAL EVERY 6 HOURS PRN
Status: DISCONTINUED | OUTPATIENT
Start: 2019-11-26 | End: 2019-12-02

## 2019-11-26 NOTE — PHYSICAL THERAPY NOTE
PHYSICAL THERAPY EVALUATION - INPATIENT     Room Number: 457/457-A  Evaluation Date: 11/26/2019  Type of Evaluation: Initial  Physician Order: PT Eval and Treat    Presenting Problem: confusion, weakness  Reason for Therapy: Mobility Dysfunction and OBJECTIVE  Precautions: Bed/chair alarm;Hard of hearing  Fall Risk: High fall risk    WEIGHT BEARING RESTRICTION  Weight Bearing Restriction: None                PAIN ASSESSMENT  Ratin  Location: denies pain       COGNITION  · Safety Judgement:  de tested  Comment : Score based on FIM definitions per department protocol. Skilled Therapy Provided:   Session approved by RN. Pt received supine in bed, agreeable to therapy.       Supine to Sit: min asst at trunk for balance  Sit to supine: not assessed Functional outcome measures completed include AM-PAC. Based on this evaluation, patient's clinical presentation is evolving and overall the evaluation complexity is considered moderate.   These impairments and comorbidities manifest themselves as functiona

## 2019-11-26 NOTE — H&P
[id number] Patient Status:  Inpatient    10/10/1945 MRN [id number]   [facility] 4SW-A Attending Rambo Dickson, DO   Hosp Day # 0 PCP Flory Viveros MD     Date:  2019  Date of Admission: benign     Past Surgical History:   Procedure Laterality Date   • HERNIA SURGERY  2010    Dr. Leatha Serrano    • OTHER SURGICAL HISTORY  12/2013 and 3/2014    video swallow study   • OTHER SURGICAL HISTORY  06/2012     Family History   Problem Relation Age of Ons Regular rate and rhythm. No murmurs. Equal pulses   Abdomen: Soft, nontender, nondistended. Positive bowel sounds.  No rebound tenderness, fluid wave  Neurologic: mild bilateral ptosis, hard of hearing, weak and slow movements throughout   Musculoskeletal obscured. Tortuous aorta. Consider upright PA and lateral examination of the chest, when tolerated.     Dictated by: Rock Baca MD on 11/26/2019 at 7:02     Approved by: Rock Baca MD on 11/26/2019 at 7:02          Xr Chest Ap Portable  (cpt=7104 ammonia (mild), per patient/son he had diarrhea pta, will have to adjust dose to keep bowel movements to 2-4 times per day   - is on hydrocodone chronically as well as valium, [place]  reviewed, it does not appear that he has had issues w/ these meds in the

## 2019-11-26 NOTE — CONSULTS
[place] Patient Status:  Inpatient    10/10/1945 MRN [id number]   [facility] [place] 4SW-A Attending [facility]   Hosp Day # 0 PCP Abhay Willingham MD     Date of Admission: 2019  Admission Diagnosis: Somn PAIN, Disp: 90 tablet, Rfl: 0  HYDROcodone-ibuprofen 7.5-200 MG Oral Tab, Take 1 tablet by mouth every 8 (eight) hours as needed for Pain., Disp: 90 tablet, Rfl: 0  Metoprolol Succinate ER 50 MG Oral Tablet 24 Hr, Take 1 tablet (50 mg total) by mouth once palpitations, edema, orthopnea, or syncope  Respiratory: denies SOB, dyspnea on exertion, + cough, denies hemoptysis, wheezing, pleurisy  GI: denies nausea, vomiting, diarrhea, constipation, melena, abdominal pain  : denies hematuria, dysuria, hesitancy, MCHC 34.2 11/26/2019    RDW 12.5 11/26/2019    .0 11/26/2019     Lab Results   Component Value Date     11/26/2019    K 3.2 11/26/2019     11/26/2019    CO2 24.0 11/26/2019    BUN 23 11/26/2019    CREATSERUM 0.87 11/26/2019

## 2019-11-26 NOTE — CONSULTS
[facility]  Neurology  Hospital Consultation Report    Savanah Maher Patient Status:  Inpatient    10/10/1945 MRN [id number]   [facility] 4SW-A Attending Emily Perez, [address] [facility] Day # 0 PCP Sammi Hodgkins, MD   Date of Admission 5,000 Units, 5,000 Units, Subcutaneous, Q8H [facility]  acetaminophen (TYLENOL) tab 650 mg, 650 mg, Oral, Q6H PRN  ondansetron HCl (ZOFRAN) injection 4 mg, 4 mg, Intravenous, Q6H PRN  Metoclopramide HCl (REGLAN) injection 10 mg, 10 mg, Intravenous, Q8H PRN  azith above  CARDIOVASCULAR: denies chest pain on exertion; no palpitations  GI: denies abdominal pain, denies heartburn, denies vomiting, constipation,diarrhea,nausea; no rectal bleeding  : no complaint of urinary incontinence  PSYCH: denies depression or anx 11/26/2019    BUN 23 (H) 11/26/2019     11/26/2019    K 3.2 (L) 11/26/2019     (H) 11/26/2019    CO2 24.0 11/26/2019     (H) 11/26/2019    CA 8.0 (L) 11/26/2019    ALB 2.8 (L) 11/25/2019    ALKPHO 71 11/25/2019    TP 7.2 11/25/2019    AS Approved by: Luz Elena Giraldo MD on 11/25/2019 at 21:23          Pertinent Labs and Imaging: Reviewed.     Impression:     Somnolence  Likely multifactorial, including but not limited to elevated ammonia, underlying medical problems, unintended but negative effec

## 2019-11-26 NOTE — OCCUPATIONAL THERAPY NOTE
Attempted to see pt for skilled OT services this date. Pt is currently occupied with EEG. Will re-attempt as schedule allows.  [facility], 11/26/19, 9:00 AM

## 2019-11-26 NOTE — PLAN OF CARE
Pt has had hiccups since early this am/ he states it hurts when he hiccups in his sternal area. He also chokes every time he sips any liquid. Pt is NPO waiting on GI consult.

## 2019-11-26 NOTE — CM/SW NOTE
11/26/19 1300   CM/SW Referral Data   Referral Source Physician   Reason for Referral Discharge planning   Informant Patient   Social History   Recreational Drug/Alcohol Use no   Major Changes Last 6 Months no   Domestic/Partner Violence no   Suicidal I

## 2019-11-26 NOTE — SLP NOTE
ADULT SWALLOWING EVALUATION    ASSESSMENT    ASSESSMENT/OVERALL IMPRESSION:  Pt seen at bedside this PM for swallow evaluation. Pt admitted to hospital due to AMS. Pt diagnosed with sepsis secondary to pneumonia. Pt's history significant for HNC.  Speech co liquids  Precautions: None    Patient/Family Goals: To eat and drink safely    SWALLOWING HISTORY  Current Diet Consistency: NPO  Dysphagia History: See above  Imaging Results: CXR 11/25/19: =====  CONCLUSION:    1.  Shallow inspiratory film with bilateral

## 2019-11-26 NOTE — ED INITIAL ASSESSMENT (HPI)
Family member called that does not live with pt stating he was not acting his norm, medics stated he was not getting up to the bathroom but just laying in his urine and feces, medics noted strong odor of urine upon arrival, HR in 120s, warm to touch, curre

## 2019-11-26 NOTE — PLAN OF CARE
Received patient from ER. Patient drowsy but easily arousable. Oriented to self, place and year. Follows commands. Lactulose ordered TID for ammonia level. Tolerating room air. CXR scheduled for the morning. VSS. NSR with PVCs. Afebrile.  IVF infusing per o

## 2019-11-26 NOTE — ED PROVIDER NOTES
Patient Seen in: [facility] Emergency Department      History   Patient presents with:  Altered Mental Status (neurologic)  Fever (infectious)    Stated Complaint: altered mental status    HPI    History obtained with the assistance the patient's so noted in HPI. Constitutional and vital signs reviewed. All other systems reviewed and negative except as noted above.     Physical Exam     ED Triage Vitals [11/25/19 1951]   /67   Pulse 104   Resp 18   Temp    Temp src    SpO2 96 %   O2 Device HGB 12.0 (*)     HCT 34.9 (*)     Neutrophil Absolute Prelim 9.61 (*)     Neutrophil Absolute 9.61 (*)     Lymphocyte Absolute 0.76 (*)     All other components within normal limits   LACTIC ACID, PLASMA - Normal   CK CREATINE KINASE (NOT CREATININE) - Nor total of 35 minutes of critical care time (exclusive of billable procedures) was administered to manage the patient's unstable vital signs, cardiovascular instability and neurologic instability due to his pneumonia, sepsis, encephalopathy.   This involved d

## 2019-11-26 NOTE — PLAN OF CARE
Ambulated pt to bathroom with a lot of direction needed. Would not lift up head. Pt had large black stool. No blood noted. Consult called to neuro. Speech to eval for coughing after drinking. Dr. Troncoso  of stool.

## 2019-11-26 NOTE — PROGRESS NOTES
ICU  Critical Care APRN Progress Note    NAME: Bianca Holley - ROOM: 270/Critical access hospital-B - MRN: [id number] - Age: 66year old - :10/10/1945    History Of Present Illness:  Bianca Holley is a 66year old male with PMHx significant for dvt, head and neck cancer, h Lungs: diminished to auscultation bilaterally, respirations unlabored  Heart: Regular rate and rhythm, S1 and S2 normal, no murmur, rub or gallop  Abdomen: Soft, non-tender, bowel sounds active all four quadrants, no masses, no organomegaly  Extremities: E BILT 0.9 11/25/2019    TP 7.2 11/25/2019    AST 36 11/25/2019    ALT 22 11/25/2019       Assessment/Plan:    #sepsis 2/2 pneumonia  -pan cultured and on BSAB  -maintain SBP>90  -daily labs, cxr  -send procal, urine,sputum if able  -rvp negative    #AMS  -

## 2019-11-26 NOTE — PROCEDURES
[address]  [facility], [address] [facility]      PATIENT'S NAME: Canelo Palencia   ATTENDING PHYSICIAN: Rashid Godfrey MD   PATIENT ACCOUNT #: [de-identified] LOCATION: [address] [place] [address]   MEDICAL RECORD #: [id number] DATE OF BIRTH: 8 seen in a variety of metabolic abnormalities, infectious etiologies, or postictal state. There was no active epileptiform activity identified in this recording, though. Clinical correlation is indicated.     Dictated By Guru Sullivan M.D.  d: 11/26/2019 11:13

## 2019-11-27 ENCOUNTER — APPOINTMENT (OUTPATIENT)
Dept: GENERAL RADIOLOGY | Facility: HOSPITAL | Age: 74
DRG: 871 | End: 2019-11-27
Attending: INTERNAL MEDICINE
Payer: MEDICARE

## 2019-11-27 ENCOUNTER — APPOINTMENT (OUTPATIENT)
Dept: ULTRASOUND IMAGING | Facility: HOSPITAL | Age: 74
DRG: 871 | End: 2019-11-27
Attending: INTERNAL MEDICINE
Payer: MEDICARE

## 2019-11-27 ENCOUNTER — APPOINTMENT (OUTPATIENT)
Dept: CV DIAGNOSTICS | Facility: HOSPITAL | Age: 74
DRG: 871 | End: 2019-11-27
Attending: INTERNAL MEDICINE
Payer: MEDICARE

## 2019-11-27 ENCOUNTER — APPOINTMENT (OUTPATIENT)
Dept: MRI IMAGING | Facility: HOSPITAL | Age: 74
DRG: 871 | End: 2019-11-27
Attending: INTERNAL MEDICINE
Payer: MEDICARE

## 2019-11-27 LAB
ALBUMIN SERPL-MCNC: 2.3 G/DL (ref 3.4–5)
ALBUMIN/GLOB SERPL: 0.6 {RATIO} (ref 1–2)
ALP LIVER SERPL-CCNC: 59 U/L (ref 45–117)
ALT SERPL-CCNC: 16 U/L (ref 16–61)
AMMONIA PLAS-MCNC: 29 UMOL/L (ref 11–32)
ANION GAP SERPL CALC-SCNC: 8 MMOL/L (ref 0–18)
APTT PPP: 35.7 SECONDS (ref 25.4–36.1)
AST SERPL-CCNC: 21 U/L (ref 15–37)
B BURGDOR IGG+IGM SER QL: NEGATIVE
BASOPHILS # BLD AUTO: 0.02 X10(3) UL (ref 0–0.2)
BASOPHILS NFR BLD AUTO: 0.3 %
BILIRUB SERPL-MCNC: 0.3 MG/DL (ref 0.1–2)
BUN BLD-MCNC: 15 MG/DL (ref 7–18)
BUN/CREAT SERPL: 19 (ref 10–20)
CALCIUM BLD-MCNC: 8.4 MG/DL (ref 8.5–10.1)
CHLORIDE SERPL-SCNC: 117 MMOL/L (ref 98–112)
CK SERPL-CCNC: 97 U/L (ref 39–308)
CO2 SERPL-SCNC: 25 MMOL/L (ref 21–32)
CREAT BLD-MCNC: 0.79 MG/DL (ref 0.7–1.3)
DEPRECATED RDW RBC AUTO: 45.1 FL (ref 35.1–46.3)
EOSINOPHIL # BLD AUTO: 0.1 X10(3) UL (ref 0–0.7)
EOSINOPHIL NFR BLD AUTO: 1.3 %
ERYTHROCYTE [DISTWIDTH] IN BLOOD BY AUTOMATED COUNT: 12.8 % (ref 11–15)
EST. AVERAGE GLUCOSE BLD GHB EST-MCNC: 97 MG/DL (ref 68–126)
GLOBULIN PLAS-MCNC: 4 G/DL (ref 2.8–4.4)
GLUCOSE BLD-MCNC: 101 MG/DL (ref 70–99)
HAV IGM SER QL: NONREACTIVE
HBA1C MFR BLD HPLC: 5 % (ref ?–5.7)
HBV CORE IGM SER QL: NONREACTIVE
HBV SURFACE AG SERPL QL IA: NONREACTIVE
HCT VFR BLD AUTO: 28.9 % (ref 39–53)
HCV AB SERPL QL IA: NONREACTIVE
HGB BLD-MCNC: 9.8 G/DL (ref 13–17.5)
IMM GRANULOCYTES # BLD AUTO: 0.04 X10(3) UL (ref 0–1)
IMM GRANULOCYTES NFR BLD: 0.5 %
INR BLD: 1.38 (ref 0.9–1.1)
LYMPHOCYTES # BLD AUTO: 1.2 X10(3) UL (ref 1–4)
LYMPHOCYTES NFR BLD AUTO: 15.5 %
M PROTEIN MFR SERPL ELPH: 6.3 G/DL (ref 6.4–8.2)
MCH RBC QN AUTO: 32.8 PG (ref 26–34)
MCHC RBC AUTO-ENTMCNC: 33.9 G/DL (ref 31–37)
MCV RBC AUTO: 96.7 FL (ref 80–100)
MONOCYTES # BLD AUTO: 0.52 X10(3) UL (ref 0.1–1)
MONOCYTES NFR BLD AUTO: 6.7 %
NEUTROPHILS # BLD AUTO: 5.87 X10 (3) UL (ref 1.5–7.7)
NEUTROPHILS # BLD AUTO: 5.87 X10(3) UL (ref 1.5–7.7)
NEUTROPHILS NFR BLD AUTO: 75.7 %
OSMOLALITY SERPL CALC.SUM OF ELEC: 311 MOSM/KG (ref 275–295)
PHOSPHATE SERPL-MCNC: 2 MG/DL (ref 2.5–4.9)
PLATELET # BLD AUTO: 241 10(3)UL (ref 150–450)
POTASSIUM SERPL-SCNC: 3.2 MMOL/L (ref 3.5–5.1)
POTASSIUM SERPL-SCNC: 3.7 MMOL/L (ref 3.5–5.1)
PSA SERPL DL<=0.01 NG/ML-MCNC: 17.7 SECONDS (ref 12.5–14.7)
RBC # BLD AUTO: 2.99 X10(6)UL (ref 3.8–5.8)
SODIUM SERPL-SCNC: 150 MMOL/L (ref 136–145)
TROPONIN I SERPL-MCNC: 1.66 NG/ML (ref ?–0.04)
TROPONIN I SERPL-MCNC: 1.91 NG/ML (ref ?–0.04)
WBC # BLD AUTO: 7.8 X10(3) UL (ref 4–11)

## 2019-11-27 PROCEDURE — 93306 TTE W/DOPPLER COMPLETE: CPT | Performed by: INTERNAL MEDICINE

## 2019-11-27 PROCEDURE — 76700 US EXAM ABDOM COMPLETE: CPT | Performed by: INTERNAL MEDICINE

## 2019-11-27 PROCEDURE — 70553 MRI BRAIN STEM W/O & W/DYE: CPT | Performed by: INTERNAL MEDICINE

## 2019-11-27 PROCEDURE — 74230 X-RAY XM SWLNG FUNCJ C+: CPT | Performed by: INTERNAL MEDICINE

## 2019-11-27 RX ORDER — ASPIRIN 81 MG/1
81 TABLET ORAL DAILY
Status: DISCONTINUED | OUTPATIENT
Start: 2019-11-27 | End: 2019-11-30 | Stop reason: SDUPTHER

## 2019-11-27 RX ORDER — DEXTROSE, SODIUM CHLORIDE, AND POTASSIUM CHLORIDE 5; .45; .15 G/100ML; G/100ML; G/100ML
INJECTION INTRAVENOUS CONTINUOUS
Status: DISCONTINUED | OUTPATIENT
Start: 2019-11-27 | End: 2019-11-29

## 2019-11-27 RX ORDER — POTASSIUM CHLORIDE 14.9 MG/ML
20 INJECTION INTRAVENOUS ONCE
Status: DISCONTINUED | OUTPATIENT
Start: 2019-11-27 | End: 2019-11-27

## 2019-11-27 RX ORDER — CYANOCOBALAMIN 1000 UG/ML
1000 INJECTION INTRAMUSCULAR; SUBCUTANEOUS DAILY
Status: DISPENSED | OUTPATIENT
Start: 2019-11-27 | End: 2019-11-30

## 2019-11-27 NOTE — DIETARY NOTE
[facility]    NUTRITION INITIAL ASSESSMENT    Pt does not meet malnutrition criteria.       NUTRITION DIAGNOSIS/PROBLEM:    Inadequate energy intake related to physiological causes as evidenced by NPO for 3 days and speech reccomendations (Mechanical s grams protein/day (1.2-1.5 grams protein per kg)  Fluid: ~1 ml/kcal or per MD discretion    MONITOR AND EVALUATE/NUTRITION GOALS:    1. PO intake to meet at least 75% patient nutrition prescription  2. At least 75% intake of oral supplements  3.  No signs o

## 2019-11-27 NOTE — PROGRESS NOTES
Troponin level 1.910. ekg and repeat troponin ordered. pt on 2 liters o2. Cards consult. Dr Geraldine Christensen saw and will transfer to ctu 2. Report given to renny and pt going into 3815.

## 2019-11-27 NOTE — PLAN OF CARE
Pt. Received A/O x4. Drowsy, but arousable. Sons at bedside. ST w/ occ PVCs on monitor. Lungs clear/diminished. Abd soft/nontender. Denies pain,SOB or discomfort. Will monitor. 2300- Report given to [place]. Patient remains NPO, all belongings collected.

## 2019-11-27 NOTE — CM/SW NOTE
Call to [facility] at [place] [phone], left VM to check if patient is clinically accepted to [facility]. Await call back. 1550: spoke with Josh Acosta at [employer], patient does not qualify for  JARRETT unit.  Call to wife javad [phone] to discuss jarrett options, Rita Albert

## 2019-11-27 NOTE — PROGRESS NOTES
[facility] Hospitalist Progress Note   CC: follow-up hospital admission      SUBJECTIVE:  Interval History:   - had black stools per icu rn, gi consulted  - US abd w/ Coarsened hepatic echotexture with increased echogenicity consistent with hepatic infiltrative p rebound tenderness  Neurologic: No focal neurological deficits. Musculoskeletal: Full range of motion of all extremities. No swelling noted. Integument: No lesions. No erythema. Psychiatric: Appropriate mood and affect.     Data Review:   CBC:   Recent anemia   - gi consulted  - npo  - ppi iv bid   - trend hemoglobin      Elevated ammonia, elevated INR - US liver w/ coarse echotexture consistent w/ infiltrative process   - unclear etiology   - GI consulted  - has no elevation in transaminases, will check

## 2019-11-27 NOTE — SLP NOTE
ADULT VIDEOFLUOROSCOPIC SWALLOWING STUDY    Admission Date: 11/25/2019  Evaluation Date: 11/27/19  Radiologist: Dr. Clarke [place]: Mechanical soft chopped(pleasure feeds only)  Diet Recommendations - Liquid: NP DYSPHAGIA ASSESSMENT  Test completed in conjunction with Radiologist.  Patient Positioned: Upright;FAISAL chair(kyphotic posture. Needed frequent reminders to keep head neutral position). Patient Viewed: Lateral.  Patient Alertness: Fully alert.   Consiste (seconds): 1  Residue Severity, Location: Severe; Throughtout pharynx  Cleared/Reduced with: Attempted however ineffective; Secondary swallow  Laryngeal Penetration: None(however at high risk d/t severe residue)  Tracheal Aspiration: None(however at high ris reminders and cues to take small single sips. When presented with thin liquids, he took large consecutive swallows despite verbal directions for \"a small single sip\". Pt demonstrated adequate retrieval and containment with all consistencies trialed.   H liquids without overt signs or symptoms of aspiration with 100 % accuracy over 3 session(s).   In Progress   Goal #2 The patient/family/caregiver will demonstrate understanding and implementation of aspiration precautions and swallow strategies independentl

## 2019-11-27 NOTE — PROGRESS NOTES
[place] Patient Status:  Inpatient    10/10/1945 MRN [id number]   [facility] 5NW-A Attending Dianna Ray, [address] [facility] Day # 1 PCP Ute Roberto MD     Pulm / Critical Care Progress Note     S: pt seems improved. --  28.9*   .0 263.0  --  241.0     Recent Labs   Lab 11/26/19 0439 11/27/19  0502   INR 1.47* 1.38*         Recent Labs   Lab 11/25/19 2003 11/26/19 0439 11/27/19  0502    144 150*   K 3.4* 3.2* 3.2*    114* 117*   CO2 24.0 24.0 25.

## 2019-11-27 NOTE — PLAN OF CARE
Problem: somnolence, pneumonia, encephalopathy  Data: Patient drowsy and oriented x3-4 overnight, on 2L O2 per NC to maintain saturation >90%. Patient up with assist with walker to void, incontinent of small amount of stool.  Patient denies pain, NPO at Arman Spotted Assess pt frequently for physical needs  - Identify cognitive and physical deficits and behaviors that affect risk of falls.   - [place] fall precautions as indicated by assessment.  - Educate pt/family on patient safety including physical limitations  -

## 2019-11-27 NOTE — OCCUPATIONAL THERAPY NOTE
OCCUPATIONAL THERAPY EVALUATION - INPATIENT     Room Number: 457/457-A  Evaluation Date: 11/26/2019  Type of Evaluation: Initial  Presenting Problem: AMS, sepsis secondary to PNA , somnolence     Physician Order: IP Consult to Occupational Therapy  Reason OBJECTIVE  Precautions: Bed/chair alarm;Hard of hearing  Fall Risk: High fall risk    WEIGHT BEARING RESTRICTION  Weight Bearing Restriction: None                PAIN ASSESSMENT  Ratin  Location: N/A       COGNITION  Safety Judgement:  decreased aw verbal/visual/tactile cues for safety with RW use and hand placement. Pt performed functional mobility with mod assistance and RW x approx 2ft>chair. Pt was educated on safety precautions.  Pt was left in his chair with questions answered, needs met and heaven Discharge Recommendations: Sub-acute rehabilitation(ELOS 11-14 days )  OT Device Recommendations: TBD    PLAN  OT Treatment Plan: Balance activities; Energy conservation/work simplification techniques;ADL training;Functional transfer training; Endurance [place] Trujillo

## 2019-11-27 NOTE — PROGRESS NOTES
Multidisciplinary Discharge Rounds held 11/27/2019. Treatment team members present today include , , Charge Nurse, Nurse, RT, PT and Pharmacy caring for [facility].      Other care providers present:    Mobility Goal:    Read

## 2019-11-27 NOTE — CONSULTS
[facility]    Report of Gastroenterology Consultation    Cristian Garcia Patient Status:  Inpatient    10/10/1945 MRN [id number]   [facility] 5NW-A Attending Alma Mohan, [address] [facility] Day # 1 PCP Alexa Rey MD     Date of Admissio Oral, Q6H PRN  •  ondansetron HCl (ZOFRAN) injection 4 mg, 4 mg, Intravenous, Q6H PRN  •  Metoclopramide HCl (REGLAN) injection 10 mg, 10 mg, Intravenous, Q8H PRN  •  azithromycin (ZITHROMAX) 500 mg in sodium chloride 0.9% 250 mL IVPB, 500 mg, Intravenous, apnea. Genitourinary: Denies kidney stones, painful/difficult urination, frequent urinary infections, frequent urination, blood in urine, incontinence, kidney failure, prostate problems. Endocrine: Denies thyroid disease, denies diabetes.   Female complai 0.79 11/27/2019    BUN 15 11/27/2019     11/27/2019    K 3.2 11/27/2019     11/27/2019    CO2 25.0 11/27/2019     11/27/2019    CA 8.4 11/27/2019    ALB 2.3 11/27/2019    ALKPHO 59 11/27/2019    BILT 0.3 11/27/2019    TP 6.3 11/27/2019

## 2019-11-27 NOTE — PROGRESS NOTES
[place] [facility]  Neurology  [facility]    Sami Trujillo Patient Status:  Inpatient    10/10/1945 MRN [id number]   [facility] 4SW-A Attending Celina Antonio, [address] [facility] Day # 1 PCP Tobin Urban MD   Date of Admission:  6 mcg, Intramuscular, Daily  potassium & sodium phosphates (NEUTRA-PHOS) 280-160-250 MG powder packet 1 packet, 1 packet, Oral, TID CC  acetaminophen (TYLENOL) tab 650 mg, 650 mg, Oral, Q6H PRN  ondansetron HCl (ZOFRAN) injection 4 mg, 4 mg, Intravenous, Q6H above  CARDIOVASCULAR: denies chest pain on exertion; no palpitations  GI: denies abdominal pain, denies heartburn, denies vomiting, constipation,diarrhea,nausea; no rectal bleeding  : no complaint of urinary incontinence  PSYCH: denies depression or anx 11/27/2019    CREATSERUM 0.79 11/27/2019    BUN 15 11/27/2019     (H) 11/27/2019    K 3.2 (L) 11/27/2019     (H) 11/27/2019    CO2 25.0 11/27/2019     (H) 11/27/2019    CA 8.4 (L) 11/27/2019    ALB 2.3 (L) 11/27/2019    ALKPHO 59 11/27/2 projection. COMPARISON:  [facility] , XR, XR VIDEO SWALLOW (CPT=74230), 12/24/2013, 9:39. INDICATIONS:  r/o aspiration- hx HNC and dysphagia  PATIENT STATED HISTORY: (As transcribed by Technologist)  Patient offered no additional history at this time.    CINE neurologically stable. As before, judicious use of opioids. I will defer to the hospitalist team and medical subspecialists evaluation and treatment of the multiple medical problems. No additional neurological testing is anticipated at this time.

## 2019-11-27 NOTE — CONSULTS
[facility] Cardiology  Report of Consultation    Tanika Rodriguez Patient Status:  Inpatient    10/10/1945 MRN [id number]   [facility] [place] 5NW-A Attending Arnaud Rodriguez, [address] Day # 1 PCP Alexis Pritchett MD     Reason for [facility] ondansetron HCl, Metoclopramide HCl, influenza virus vaccine PF, chlorproMAZINE HCl    Outpatient Medications:   No current facility-administered medications on file prior to encounter.    HYDROcodone-ibuprofen 7.5-200 MG Oral Tab, TAKE 1 TABLET BY MOUTH EV No focal rales, rhonchi, or wheezes. Good air movement is noted throughout all lung fields. Abdomen: Soft. Non-distended. Non-tender. Bowel sounds are present and normoactive. No guarding or rebound.    Extremities: Extremities do not demonstrate any technically limited due to poor acoustic window availability. Intravenous     contrast (Definity) was administered to enhance Doppler signals and     opacify the LV. 2. Left ventricle:  The cavity size was normal. Wall thickness was normal.     Systolic fu

## 2019-11-27 NOTE — PLAN OF CARE
Problem: Patient/Family Goals  Goal: Patient/Family Long Term Goal  Description  Patient's Long Term Goal: \"go back home\"    Interventions:  -scheduled antibiotics  -IVF  -monitor VS  - See additional Care Plan goals for specific interventions   Outcom Initiate appropriate interventions as ordered  Outcome: Progressing     Problem: Impaired Functional Mobility  Goal: Achieve highest/safest level of mobility/gait  Description  Interventions:  - Assess patient's functional ability and stability  - Promote fall precautions as indicated by assessment.  - Educate pt/family on patient safety including physical limitations  - Instruct pt to call for assistance with activity based on assessment  - Modify environment to reduce risk of injury  - Provide assistive d

## 2019-11-27 NOTE — PLAN OF CARE
Problem: Impaired Swallowing  Goal: Minimize aspiration risk  Description  Interventions:  - Patient should be alert and upright for all feedings (90 degrees preferred)  - Offer food at a slow rate  - Encourage small bites of food  - Offer pills crushed

## 2019-11-28 LAB
ALBUMIN SERPL-MCNC: 2.2 G/DL (ref 3.4–5)
ALBUMIN/GLOB SERPL: 0.6 {RATIO} (ref 1–2)
ALP LIVER SERPL-CCNC: 62 U/L (ref 45–117)
ALT SERPL-CCNC: 20 U/L (ref 16–61)
ANION GAP SERPL CALC-SCNC: 2 MMOL/L (ref 0–18)
AST SERPL-CCNC: 22 U/L (ref 15–37)
ATRIAL RATE: 86 BPM
ATRIAL RATE: 87 BPM
BASOPHILS # BLD AUTO: 0.02 X10(3) UL (ref 0–0.2)
BASOPHILS NFR BLD AUTO: 0.3 %
BILIRUB SERPL-MCNC: 0.2 MG/DL (ref 0.1–2)
BUN BLD-MCNC: 13 MG/DL (ref 7–18)
BUN/CREAT SERPL: 15.5 (ref 10–20)
CALCIUM BLD-MCNC: 7.7 MG/DL (ref 8.5–10.1)
CHLORIDE SERPL-SCNC: 121 MMOL/L (ref 98–112)
CHOLEST SMN-MCNC: 115 MG/DL (ref ?–200)
CHOLEST SMN-MCNC: 96 MG/DL (ref ?–200)
CK SERPL-CCNC: 74 U/L (ref 39–308)
CO2 SERPL-SCNC: 27 MMOL/L (ref 21–32)
CREAT BLD-MCNC: 0.84 MG/DL (ref 0.7–1.3)
DEPRECATED RDW RBC AUTO: 45.6 FL (ref 35.1–46.3)
EOSINOPHIL # BLD AUTO: 0.24 X10(3) UL (ref 0–0.7)
EOSINOPHIL NFR BLD AUTO: 3.6 %
ERYTHROCYTE [DISTWIDTH] IN BLOOD BY AUTOMATED COUNT: 12.8 % (ref 11–15)
GLOBULIN PLAS-MCNC: 3.5 G/DL (ref 2.8–4.4)
GLUCOSE BLD-MCNC: 132 MG/DL (ref 70–99)
HAV IGM SER QL: 2.2 MG/DL (ref 1.6–2.6)
HCT VFR BLD AUTO: 28.9 % (ref 39–53)
HDLC SERPL-MCNC: 23 MG/DL (ref 40–59)
HDLC SERPL-MCNC: 25 MG/DL (ref 40–59)
HGB BLD-MCNC: 9.6 G/DL (ref 13–17.5)
IMM GRANULOCYTES # BLD AUTO: 0.04 X10(3) UL (ref 0–1)
IMM GRANULOCYTES NFR BLD: 0.6 %
LDLC SERPL CALC-MCNC: 57 MG/DL (ref ?–100)
LDLC SERPL CALC-MCNC: 72 MG/DL (ref ?–100)
LYMPHOCYTES # BLD AUTO: 0.85 X10(3) UL (ref 1–4)
LYMPHOCYTES NFR BLD AUTO: 12.8 %
M PROTEIN MFR SERPL ELPH: 5.7 G/DL (ref 6.4–8.2)
MCH RBC QN AUTO: 32.4 PG (ref 26–34)
MCHC RBC AUTO-ENTMCNC: 33.2 G/DL (ref 31–37)
MCV RBC AUTO: 97.6 FL (ref 80–100)
MONOCYTES # BLD AUTO: 0.43 X10(3) UL (ref 0.1–1)
MONOCYTES NFR BLD AUTO: 6.5 %
NEUTROPHILS # BLD AUTO: 5.04 X10 (3) UL (ref 1.5–7.7)
NEUTROPHILS # BLD AUTO: 5.04 X10(3) UL (ref 1.5–7.7)
NEUTROPHILS NFR BLD AUTO: 76.2 %
NONHDLC SERPL-MCNC: 73 MG/DL (ref ?–130)
NONHDLC SERPL-MCNC: 90 MG/DL (ref ?–130)
OSMOLALITY SERPL CALC.SUM OF ELEC: 312 MOSM/KG (ref 275–295)
P AXIS: 37 DEGREES
P AXIS: 38 DEGREES
P-R INTERVAL: 154 MS
P-R INTERVAL: 156 MS
PHOSPHATE SERPL-MCNC: 2.4 MG/DL (ref 2.5–4.9)
PLATELET # BLD AUTO: 270 10(3)UL (ref 150–450)
POTASSIUM SERPL-SCNC: 3.7 MMOL/L (ref 3.5–5.1)
Q-T INTERVAL: 318 MS
Q-T INTERVAL: 326 MS
QRS DURATION: 88 MS
QRS DURATION: 88 MS
QTC CALCULATION (BEZET): 380 MS
QTC CALCULATION (BEZET): 392 MS
R AXIS: 11 DEGREES
R AXIS: 12 DEGREES
RBC # BLD AUTO: 2.96 X10(6)UL (ref 3.8–5.8)
SODIUM SERPL-SCNC: 150 MMOL/L (ref 136–145)
T AXIS: 20 DEGREES
T AXIS: 22 DEGREES
TRIGL SERPL-MCNC: 79 MG/DL (ref 30–149)
TRIGL SERPL-MCNC: 92 MG/DL (ref 30–149)
TROPONIN I SERPL-MCNC: 1.4 NG/ML (ref ?–0.04)
VENTRICULAR RATE: 86 BPM
VENTRICULAR RATE: 87 BPM
VLDLC SERPL CALC-MCNC: 16 MG/DL (ref 0–30)
VLDLC SERPL CALC-MCNC: 18 MG/DL (ref 0–30)
WBC # BLD AUTO: 6.6 X10(3) UL (ref 4–11)

## 2019-11-28 NOTE — PROGRESS NOTES
Cards    FU: NSTEMI    Doing ok. No cardiac complaints. No CP, no SOB, no FOSS. Wants to eat. No events overnight.     metoprolol Tartrate (LOPRESSOR) tab 25 mg, 25 mg, Oral, 2x Daily(Beta Blocker)  dextrose 5 % and 0.45 % NaCl with KCl 20 mEq infu

## 2019-11-28 NOTE — PLAN OF CARE
Patient is alert and oriented x4, drowsy and forgetful at time, delayed responses. NSR on tele. Oxygenation is 100% on 2 L NC. L sounds clear-diminished. VSS. Patient denies chest pain, shortness of breath, palpitations. Denies pain.  Patient is resting com replacement as ordered  - Monitor response to electrolyte replacements, including rhythm and repeat lab results as appropriate  - Fluid restriction as ordered  - Instruct patient on fluid and nutrition restrictions as appropriate  Outcome: Progressing ABGs  - Provide Smoking Cessation handout, if applicable  - Encourage broncho-pulmonary hygiene including cough, deep breathe, Incentive Spirometry  - Assess the need for suctioning and perform as needed  - Assess and instruct to report SOB or any respirat

## 2019-11-28 NOTE — PLAN OF CARE
Patient is A&Ox4, NSR on tele. Denies pain, palpitations, and SOB. Lung sounds are clear, diminished upon auscultation. Patient found to be eating thick, pureed soup at beginning of shift.  Coughing occasionally, placed two pillows behind patient to promote

## 2019-11-28 NOTE — PROGRESS NOTES
[facility] Hospitalist Progress Note   CC: follow-up hospital admission      SUBJECTIVE:  Pt seen and examined. More oriented, denies chest pain, SOB, or abd pain. No blood reported in stool or melena. No F/C.        OBJECTIVE:  Scheduled Meds:   • metoprolol t 12.0* 10.7* 10.0* 9.8* 9.6*   HCT 34.9* 31.3*  --  28.9* 28.9*   .0 263.0  --  241.0 270.0       Chem:   Recent Labs   Lab 11/25/19 2003 11/26/19  0439 11/27/19  0502 11/27/19  1540 11/28/19  0640    144 150*  --  150*   K 3.4* 3.2* 3.2* 3.7 echotexture consistent w/ infiltrative process   - unclear etiology   - GI consulted  - has no elevation in transaminases, a1c neg, lipid panel unremarkable, hep viral panel neg     - now decreased s/p lactulose, now discontinued      Possible community ac

## 2019-11-28 NOTE — OPERATIVE REPORT
[facility]  Progress Note    Cristian Garcia Patient Status:  Inpatient    10/10/1945 MRN [id number]   [facility] 2NE-A Attending Alma Mohan, [address] [facility] Day # 2 PCP Alexa Rey MD     Subjective:  Cristian Garcia is a(n) 66 year ol Follow labs. 2.  Cont PPI.   3.  May need EGD if Hb drops    Nidhi Lyon  11/28/2019  10:45 AM

## 2019-11-29 ENCOUNTER — APPOINTMENT (OUTPATIENT)
Dept: CV DIAGNOSTICS | Facility: HOSPITAL | Age: 74
DRG: 871 | End: 2019-11-29
Attending: INTERNAL MEDICINE
Payer: MEDICARE

## 2019-11-29 LAB
ALBUMIN SERPL-MCNC: 2 G/DL (ref 3.4–5)
ALBUMIN/GLOB SERPL: 0.5 {RATIO} (ref 1–2)
ALP LIVER SERPL-CCNC: 67 U/L (ref 45–117)
ALT SERPL-CCNC: 24 U/L (ref 16–61)
ANION GAP SERPL CALC-SCNC: 3 MMOL/L (ref 0–18)
AST SERPL-CCNC: 29 U/L (ref 15–37)
BASOPHILS # BLD AUTO: 0.02 X10(3) UL (ref 0–0.2)
BASOPHILS NFR BLD AUTO: 0.3 %
BILIRUB SERPL-MCNC: 0.2 MG/DL (ref 0.1–2)
BUN BLD-MCNC: 8 MG/DL (ref 7–18)
BUN/CREAT SERPL: 9.5 (ref 10–20)
CALCIUM BLD-MCNC: 7.9 MG/DL (ref 8.5–10.1)
CHLORIDE SERPL-SCNC: 118 MMOL/L (ref 98–112)
CO2 SERPL-SCNC: 26 MMOL/L (ref 21–32)
CREAT BLD-MCNC: 0.84 MG/DL (ref 0.7–1.3)
DEPRECATED RDW RBC AUTO: 46.8 FL (ref 35.1–46.3)
EOSINOPHIL # BLD AUTO: 0.35 X10(3) UL (ref 0–0.7)
EOSINOPHIL NFR BLD AUTO: 5.5 %
ERYTHROCYTE [DISTWIDTH] IN BLOOD BY AUTOMATED COUNT: 12.8 % (ref 11–15)
GLOBULIN PLAS-MCNC: 3.7 G/DL (ref 2.8–4.4)
GLUCOSE BLD-MCNC: 115 MG/DL (ref 70–99)
HAV IGM SER QL: 2.1 MG/DL (ref 1.6–2.6)
HCT VFR BLD AUTO: 26.3 % (ref 39–53)
HEMATOCRIT (CLIENT SUPPLIED): 28.9 %
HGB BLD-MCNC: 8.5 G/DL (ref 13–17.5)
IMM GRANULOCYTES # BLD AUTO: 0.04 X10(3) UL (ref 0–1)
IMM GRANULOCYTES NFR BLD: 0.6 %
LYMPHOCYTES # BLD AUTO: 1.05 X10(3) UL (ref 1–4)
LYMPHOCYTES NFR BLD AUTO: 16.6 %
M PROTEIN MFR SERPL ELPH: 5.7 G/DL (ref 6.4–8.2)
MCH RBC QN AUTO: 32.2 PG (ref 26–34)
MCHC RBC AUTO-ENTMCNC: 32.3 G/DL (ref 31–37)
MCV RBC AUTO: 99.6 FL (ref 80–100)
MONOCYTES # BLD AUTO: 0.39 X10(3) UL (ref 0.1–1)
MONOCYTES NFR BLD AUTO: 6.2 %
NEUTROPHILS # BLD AUTO: 4.47 X10 (3) UL (ref 1.5–7.7)
NEUTROPHILS # BLD AUTO: 4.47 X10(3) UL (ref 1.5–7.7)
NEUTROPHILS NFR BLD AUTO: 70.8 %
OSMOLALITY SERPL CALC.SUM OF ELEC: 303 MOSM/KG (ref 275–295)
PHOSPHATE SERPL-MCNC: 2.4 MG/DL (ref 2.5–4.9)
PLATELET # BLD AUTO: 281 10(3)UL (ref 150–450)
POTASSIUM SERPL-SCNC: 3.5 MMOL/L (ref 3.5–5.1)
POTASSIUM SERPL-SCNC: 3.8 MMOL/L (ref 3.5–5.1)
RBC # BLD AUTO: 2.64 X10(6)UL (ref 3.8–5.8)
SODIUM SERPL-SCNC: 147 MMOL/L (ref 136–145)
WBC # BLD AUTO: 6.3 X10(3) UL (ref 4–11)

## 2019-11-29 PROCEDURE — 78452 HT MUSCLE IMAGE SPECT MULT: CPT | Performed by: INTERNAL MEDICINE

## 2019-11-29 PROCEDURE — 93018 CV STRESS TEST I&R ONLY: CPT | Performed by: INTERNAL MEDICINE

## 2019-11-29 PROCEDURE — 93017 CV STRESS TEST TRACING ONLY: CPT | Performed by: INTERNAL MEDICINE

## 2019-11-29 RX ORDER — POTASSIUM CHLORIDE 20 MEQ/1
40 TABLET, EXTENDED RELEASE ORAL EVERY 4 HOURS
Status: COMPLETED | OUTPATIENT
Start: 2019-11-29 | End: 2019-11-29

## 2019-11-29 RX ORDER — POTASSIUM CHLORIDE 20 MEQ/1
40 TABLET, EXTENDED RELEASE ORAL ONCE
Status: COMPLETED | OUTPATIENT
Start: 2019-11-29 | End: 2019-11-30

## 2019-11-29 NOTE — OCCUPATIONAL THERAPY NOTE
OCCUPATIONAL THERAPY TREATMENT NOTE - INPATIENT     Room Number: 7644/2628-W  Session: 1   Number of Visits to Meet Established Goals: 5    Presenting Problem: AMS, sepsis secondary to PNA , somnolence     History related to current admission: Pt is 74 yea Toileting, which includes using toilet, bedpan or urinal? : A Little  -   Putting on and taking off regular upper body clothing?: A Little  -   Taking care of personal grooming such as brushing teeth?: A Little  -   Eating meals?: None    AM-PAC Score:  Sc techniques;ADL training;Functional transfer training; Endurance training;Patient/Family education;Patient/Family training;Equipment eval/education; Compensatory technique education;Continued evaluation  Rehab Potential : Fair  Frequency (Obs): 3-5x/week

## 2019-11-29 NOTE — SLP NOTE
SPEECH DAILY NOTE - INPATIENT    ASSESSMENT & PLAN   ASSESSMENT  Pt seen for dysphagia tx to assess tolerance with recommended diet, ensure proper utilization of aspiration precautions and provide pt/family education.  Pt currently on mechanical soft choppe *Patient remains at risk for aspiration with PO intake as per recent VFSS    *Recommend frequent good oral care/hygiene      *If s/s aspiration and/or respiratory decline observed, downgrade to strict NPO and may need to consider alternate means of nut

## 2019-11-29 NOTE — PROGRESS NOTES
Pulmonary Progress Note        NAME: Savanah Maher - ROOM: 8883/0159-T - MRN: [id number] - Age: 66year old - : 10/10/1945        Last 24hrs: No events overnight, worried about what he's hearing from cardiology    OBJECTIVE:   19  2 --  27.0 26.0   BUN 15  --  13 8   CA 8.4*  --  7.7* 7.9*   MG  --   --  2.2 2.1   PHOS 2.0*  --  2.4* 2.4*       Recent Labs     11/27/19  0502 11/28/19  0640 11/29/19  0642   ALT 16 20 24   AST 21 22 29   ALB 2.3* 2.2* 2.0*       Invalid input(s): ARTERI w/ Dr. Wendi Lezama or TEE in 1-2 weeks and repeat imaging in 6-8 weeks     Franky Miranda [facility] Pulmonary and Critical Care

## 2019-11-29 NOTE — PROGRESS NOTES
[facility] Hospitalist Progress Note   CC: follow-up hospital admission      SUBJECTIVE:  Pt seen and examined. Doing well, eating lunch, no complaints. No F/C.        OBJECTIVE:  Scheduled Meds:   • metoprolol tartrate  37.5 mg Oral 2x Daily(Beta Blocker)   • P 3.23*  --  2.99* 2.96* 2.64*   HGB 12.0* 10.7* 10.0* 9.8* 9.6* 8.5*   HCT 34.9* 31.3*  --  28.9* 28.9* 26.3*   .0 263.0  --  241.0 270.0 281. 0       Chem:   Recent Labs   Lab 11/25/19 2003 11/26/19  2930 11/27/19  0502 11/27/19  1540 11/28/19  7660 normal  - plan nuclear stress today, await results      Elevated ammonia, elevated INR - US liver w/ coarse echotexture consistent w/ infiltrative process   - unclear etiology   - GI consulted  - has no elevation in transaminases, a1c neg, lipid panel unre

## 2019-11-29 NOTE — PROGRESS NOTES
[facility]    Progress Note    Chace Fisher Patient Status:  Inpatient    10/10/1945 MRN [id number]   [facility] 2NE-A Attending Harmony Salamanca, [address] [facility] Day # 3 PCP Sussy Sanders MD     Subjective:  Chace Fisher is a(n 66 yea (degenerative joint disease), cervical     Osteoarthritis of lumbar spine, unspecified spinal osteoarthritis complication status     History of basal cell cancer     Somnolence     Pneumonia of left upper lobe due to infectious organism           Plan:

## 2019-11-29 NOTE — PROGRESS NOTES
[facility]  Cardiology Progress Note    Leyla Kaur Patient Status:  Inpatient    10/10/1945 MRN [id number]   [facility] 2NE-A Attending Nasrin Posada, [address] [facility] Day # 3 PCP Shanika Wolf MD     Assessment:  NSTEMI-+troponin and e 1951 : 170 lb (77.1 kg)  09/18/19 1826 : 171 lb (77.6 kg)  05/10/19 1000 : 170 lb 6.4 oz (77.3 kg)  03/14/19 1149 : 166 lb (75.3 kg)      Physical Exam:  General:  no apparent distress.   HEENT:  mucous membranes moist, sclera anicteric  Neck: Supple with n

## 2019-11-29 NOTE — PROGRESS NOTES
Preliminary Cardiac Diagnostic Note:    No ekg changes noted after pt injected with lexiscan for nuclear stress test  Pt denied cardiac symptoms  Few pvcs   nuc pending.

## 2019-11-29 NOTE — PLAN OF CARE
Rec in bed. off the floor to [place]. Pt and VS appear stable denies CP/SOB. sats maintained in 90's on oxygen. TELE: SR POC updated will cont to monitor. Off floor at time of this note. Rec from [place]. Pt and VS remain stable.  Result called to this

## 2019-11-29 NOTE — CM/SW NOTE
Updates sent to [facility] via [address]. Will need insurance authorization for LETITIA.     Luke Hammonds RN, MSN, APN, CTL   Clinical Transitions Leader  [phone]

## 2019-11-29 NOTE — PLAN OF CARE
Problem: Impaired Swallowing  Goal: Minimize aspiration risk  Description  Interventions:  Mechanical soft, Honey-Thick Liquids by teaspoon sip at a time  - Good oral care/hygiene should be maintained and encouraged  - Patient should be alert and upright

## 2019-11-29 NOTE — PLAN OF CARE
Patient resting comfortably in bed. Aox4- intermittently forgetful/confused. Oxygenating 93% on RA. NSR on tele. Complains of moderate-severe neck pain controlled with Tylenol.  Maintaining chopped diet and no liquids- frequent bites of applesauce to quench or improved neurological status  Description  INTERVENTIONS  - Assess for and report changes in neurological status  - Initiate measures to prevent increased intracranial pressure  - Maintain blood pressure and fluid volume within ordered parameters to opt indicated  - Manage/alleviate anxiety  - Monitor for signs/symptoms of CO2 retention  Outcome: Progressing     Problem: SAFETY ADULT - FALL  Goal: Free from fall injury  Description  INTERVENTIONS:  - Assess pt frequently for physical needs  - Identify cog

## 2019-11-30 LAB
BASOPHILS # BLD AUTO: 0.02 X10(3) UL (ref 0–0.2)
BASOPHILS NFR BLD AUTO: 0.3 %
DEPRECATED RDW RBC AUTO: 46.1 FL (ref 35.1–46.3)
EOSINOPHIL # BLD AUTO: 0.32 X10(3) UL (ref 0–0.7)
EOSINOPHIL NFR BLD AUTO: 4.5 %
ERYTHROCYTE [DISTWIDTH] IN BLOOD BY AUTOMATED COUNT: 13 % (ref 11–15)
HAV IGM SER QL: 2 MG/DL (ref 1.6–2.6)
HCT VFR BLD AUTO: 27.8 % (ref 39–53)
HGB BLD-MCNC: 9.2 G/DL (ref 13–17.5)
IMM GRANULOCYTES # BLD AUTO: 0.04 X10(3) UL (ref 0–1)
IMM GRANULOCYTES NFR BLD: 0.6 %
LYMPHOCYTES # BLD AUTO: 1.16 X10(3) UL (ref 1–4)
LYMPHOCYTES NFR BLD AUTO: 16.4 %
MCH RBC QN AUTO: 32.5 PG (ref 26–34)
MCHC RBC AUTO-ENTMCNC: 33.1 G/DL (ref 31–37)
MCV RBC AUTO: 98.2 FL (ref 80–100)
MONOCYTES # BLD AUTO: 0.38 X10(3) UL (ref 0.1–1)
MONOCYTES NFR BLD AUTO: 5.4 %
NEUTROPHILS # BLD AUTO: 5.17 X10 (3) UL (ref 1.5–7.7)
NEUTROPHILS # BLD AUTO: 5.17 X10(3) UL (ref 1.5–7.7)
NEUTROPHILS NFR BLD AUTO: 72.8 %
PLATELET # BLD AUTO: 277 10(3)UL (ref 150–450)
POTASSIUM SERPL-SCNC: 3.8 MMOL/L (ref 3.5–5.1)
RBC # BLD AUTO: 2.83 X10(6)UL (ref 3.8–5.8)
VITAMIN B1 (THIAMINE), WHOLE B: 79 NMOL/L
WBC # BLD AUTO: 7.1 X10(3) UL (ref 4–11)

## 2019-11-30 RX ORDER — ATORVASTATIN CALCIUM 10 MG/1
10 TABLET, FILM COATED ORAL NIGHTLY
Status: DISCONTINUED | OUTPATIENT
Start: 2019-11-30 | End: 2019-12-02

## 2019-11-30 RX ORDER — ASPIRIN 81 MG/1
81 TABLET, CHEWABLE ORAL DAILY
Status: DISCONTINUED | OUTPATIENT
Start: 2019-11-30 | End: 2019-12-02

## 2019-11-30 RX ORDER — LISINOPRIL 5 MG/1
5 TABLET ORAL DAILY
Status: DISCONTINUED | OUTPATIENT
Start: 2019-11-30 | End: 2019-12-02

## 2019-11-30 RX ORDER — PANTOPRAZOLE SODIUM 40 MG/1
40 TABLET, DELAYED RELEASE ORAL
Status: DISCONTINUED | OUTPATIENT
Start: 2019-11-30 | End: 2019-12-02

## 2019-11-30 NOTE — SLP NOTE
SPEECH DAILY NOTE - INPATIENT    ASSESSMENT & PLAN   ASSESSMENT  Pt seen for dysphagia tx to assess tolerance with recommended diet, ensure proper utilization of aspiration precautions and provide pt/family education.  Patient received awake, alert, and  Small bites, Small sips, Multiple swallows, trial supraglottic swallow with mod assistance 90 % of the time across 2 sessions.  In Progress   Goal #4 Pt will complete pharyngeal exercises including, Tri, effortful swallow and Shaker to improve swallow f

## 2019-11-30 NOTE — PLAN OF CARE
Patient resting comfortably in bed, AOX4 with occasional forgetfulness. Oxygenating 93% on RA. NSR on tele. VSS. Complains of mild pain to neck and lower back controlled with PRN Tylenol. Continuing IV abx BID for PNA per orders.  Honey thick liquids and ch appropriate  - Fluid restriction as ordered  - Instruct patient on fluid and nutrition restrictions as appropriate  Outcome: Progressing     Problem: NEUROLOGICAL - ADULT  Goal: Achieves stable or improved neurological status  Description  INTERVENTIONS  - Oxygen supplementation based on oxygen saturation or ABGs  - Provide Smoking Cessation handout, if applicable  - Encourage broncho-pulmonary hygiene including cough, deep breathe, Incentive Spirometry  - Assess the need for suctioning and perform as needed

## 2019-11-30 NOTE — PROGRESS NOTES
[facility]    Progress Note    Eileen Lin Patient Status:  Inpatient    10/10/1945 MRN [id number]   [facility] 2NE-A Attending Matt García, [address] Day # 4 PCP Justin Verdugo MD     Subjective:  Eileen Lin is a(n) 64 yea Somnolence     Pneumonia of left upper lobe due to infectious organism           Plan:       Disha Seals  11/30/2019  9:48 AM

## 2019-11-30 NOTE — PROGRESS NOTES
[facility] Hospitalist Progress Note   CC: follow-up hospital admission      SUBJECTIVE:  Pt seen and examined. States he is doing well, no complaints. Stool are brown. No F/C.         OBJECTIVE:  Scheduled Meds:   • aspirin  81 mg Oral Daily   • atorvastatin HCT 31.3*  --  28.9* 28.9* 26.3* 27.8*   .0  --  241.0 270.0 281.0 277.0       Chem:   Recent Labs   Lab 11/25/19 2003 11/26/19  0439 11/27/19  0502 11/27/19  1540 11/28/19  0640 11/29/19  0642 11/29/19  1951 11/30/19  0609    144 150*  -- cardiomyopathy   - appreciate cards eval  - EF near normal  - nuclear stress results noted by cards, no cath to be pursued   - plan medical management per cards  - start lisinopril today      Elevated ammonia, elevated INR - US liver w/ coarse echotexture

## 2019-11-30 NOTE — PLAN OF CARE
Patient alert and oriented x4 and is sitting up in the chair. Medications administered as ordered. Vital signs stable. Lung sounds diminished bilaterally. Achieved 1000 on Incentive Spirometry. He is satting well on room air.  Patient wanted Tylenol this mo restrictions as appropriate  Outcome: Progressing     Problem: NEUROLOGICAL - ADULT  Goal: Achieves stable or improved neurological status  Description  INTERVENTIONS  - Assess for and report changes in neurological status  - Initiate measures to prevent i handout, if applicable  - Encourage broncho-pulmonary hygiene including cough, deep breathe, Incentive Spirometry  - Assess the need for suctioning and perform as needed  - Assess and instruct to report SOB or any respiratory difficulty  - Respiratory Ther

## 2019-11-30 NOTE — PROGRESS NOTES
[facility]  Cardiology Progress Note    Leyla Kaur Patient Status:  Inpatient    10/10/1945 MRN [id number]   [facility] 2NE-A Attending Nasrin Posada, [address] Day # 4 PCP Shanika Wolf MD     Assessment:  NSTEMI-+troponin and e kg)  03/14/19 1149 : 166 lb (75.3 kg)      Physical Exam:  General:  no apparent distress.   HEENT:  mucous membranes moist, sclera anicteric  Neck: Supple with no JVD  Cardiac:   Tachy, regular, normal S1 S2,  no murmurs/gallops  Lungs:  Clear to auscultat

## 2019-11-30 NOTE — PROGRESS NOTES
Porter Patient Status:  Inpatient    10/10/1945 MRN [id number]   [facility] 2NE-A Attending Yuli Tellez, [address] [facility] Day # 4 PCP Meena Arita MD     Pulm / Critical Care Progress Note     S: pt feels ok.  States Recent Labs   Lab 11/26/19  0439 11/27/19  0502   INR 1.47* 1.38*         Recent Labs   Lab 11/27/19  0502  11/28/19  0640 11/29/19  0642 11/29/19 1951 11/30/19  0609   *  --  150* 147*  --   --    K 3.2*   < > 3.7 3.5 3.8 3.8   *  --  121

## 2019-12-01 LAB
ANION GAP SERPL CALC-SCNC: 4 MMOL/L (ref 0–18)
BUN BLD-MCNC: 9 MG/DL (ref 7–18)
BUN/CREAT SERPL: 10.2 (ref 10–20)
CALCIUM BLD-MCNC: 8.1 MG/DL (ref 8.5–10.1)
CHLORIDE SERPL-SCNC: 111 MMOL/L (ref 98–112)
CO2 SERPL-SCNC: 26 MMOL/L (ref 21–32)
CREAT BLD-MCNC: 0.88 MG/DL (ref 0.7–1.3)
GLUCOSE BLD-MCNC: 102 MG/DL (ref 70–99)
OSMOLALITY SERPL CALC.SUM OF ELEC: 291 MOSM/KG (ref 275–295)
POTASSIUM SERPL-SCNC: 3.7 MMOL/L (ref 3.5–5.1)
SODIUM SERPL-SCNC: 141 MMOL/L (ref 136–145)

## 2019-12-01 RX ORDER — POTASSIUM CHLORIDE 20 MEQ/1
40 TABLET, EXTENDED RELEASE ORAL ONCE
Status: COMPLETED | OUTPATIENT
Start: 2019-12-01 | End: 2019-12-01

## 2019-12-01 NOTE — PLAN OF CARE
Problem: Impaired Swallowing  Goal: Minimize aspiration risk  Description  Interventions:  Mechanical soft, Honey-Thick Liquids by teaspoon sip at a time    - Patient should be alert and upright for all feedings (90 degrees preferred)  - Offer food and l

## 2019-12-01 NOTE — PROGRESS NOTES
[facility]  Cardiology Progress Note    Kar Machuca Patient Status:  Inpatient    10/10/1945 MRN [id number]   [facility] 2NE-A Attending Rambo Dickson, [address] [facility] [address] Day # 5 PCP Flory Viveros MD     Assessment:  NSTEMI-+troponin and e kg)  09/18/19 1826 : 171 lb (77.6 kg)  05/10/19 1000 : 170 lb 6.4 oz (77.3 kg)  03/14/19 1149 : 166 lb (75.3 kg)      Physical Exam:  General:  no apparent distress. HEENT:  mucous membranes moist, sclera anicteric  Neck: Supple with no JVD  Cardiac:    Ta

## 2019-12-01 NOTE — SLP NOTE
SPEECH DAILY NOTE - INPATIENT    ASSESSMENT & PLAN   ASSESSMENT  Pt seen for dysphagia tx to assess tolerance with recommended diet, ensure proper utilization of aspiration precautions and provide pt/family education. Family member present.  Reviewed resul chopped  Diet Recommendations - Liquid: Honey thick(by teaspoon sip at a time followed by double swallow)    Compensatory Strategies Recommended: (Effortful swallow; suprglottic swallow)  Aspiration Precautions: Upright position; Slow rate;Small bites and s please contact [employer]

## 2019-12-01 NOTE — PROGRESS NOTES
[facility] Hospitalist Progress Note   CC: follow-up hospital admission      SUBJECTIVE:  Pt seen and examined. States he is doing well, no complaints. Stool are brown. No F/C.         OBJECTIVE:  Scheduled Meds:   • Potassium Chloride ER  40 mEq Oral Once   • 10.0* 9.8* 9.6* 8.5* 9.2*   HCT 31.3*  --  28.9* 28.9* 26.3* 27.8*   .0  --  241.0 270.0 281.0 277.0       Chem:   Recent Labs   Lab 11/25/19 2003 11/26/19  0439 11/27/19  0502  11/28/19  0640 11/29/19  6567 11/29/19  1951 11/30/19  0609 12/01/19 neg    Melena w/ acute anemia -resolved  - gi consulted,   - ppi PO now  - trend hemoglobin -stable    NSTEMI  Ischemic cardiomyopathy   - appreciate cards eval  - EF near normal  - nuclear stress results noted by cards, no cath to be pursued   - plan medi

## 2019-12-01 NOTE — CM/SW NOTE
MSW left a message for [facility] to see if insurance Zain Cornejo has been obtained. It is unlikely as it is IHP. MSW is awaiting return call.     Shaggy Adler LCSW

## 2019-12-01 NOTE — PLAN OF CARE
Assumed care of pt. At 1900. Aox4, baseline RA, sats dropping to mid 80s while falling asleep. 2L for comfort, Sats upper 90s. Will continue to monitor. Lungs clear and diminished SHASHI. Denies pain. Honey thick liquids/chopped diet.  Takes pills in apple

## 2019-12-01 NOTE — PLAN OF CARE
Patient alert and oriented x4. Vital signs stable, normal sinus on telemetry. Lung sounds diminished bilaterally. Patient ambulated halls and tolerated well. Medications administered as ordered. Potassium replaced for K of 3.7. Satting in 90s on room air. ordered  - Instruct patient on fluid and nutrition restrictions as appropriate  Outcome: Progressing     Problem: NEUROLOGICAL - ADULT  Goal: Achieves stable or improved neurological status  Description  INTERVENTIONS  - Assess for and report changes in ne saturation or ABGs  - Provide Smoking Cessation handout, if applicable  - Encourage broncho-pulmonary hygiene including cough, deep breathe, Incentive Spirometry  - Assess the need for suctioning and perform as needed  - Assess and instruct to report SOB o

## 2019-12-02 VITALS
RESPIRATION RATE: 18 BRPM | BODY MASS INDEX: 25.98 KG/M2 | TEMPERATURE: 98 F | HEIGHT: 67 IN | DIASTOLIC BLOOD PRESSURE: 76 MMHG | HEART RATE: 80 BPM | SYSTOLIC BLOOD PRESSURE: 116 MMHG | OXYGEN SATURATION: 97 % | WEIGHT: 165.5 LBS

## 2019-12-02 LAB
DRUG SCREEN (NONFORENSIC), URINE: POSITIVE
POTASSIUM SERPL-SCNC: 3.7 MMOL/L (ref 3.5–5.1)

## 2019-12-02 RX ORDER — ASPIRIN 81 MG/1
81 TABLET, CHEWABLE ORAL DAILY
Qty: 30 TABLET | Refills: 0 | Status: SHIPPED | OUTPATIENT
Start: 2019-12-03 | End: 2021-06-23

## 2019-12-02 RX ORDER — LISINOPRIL 5 MG/1
5 TABLET ORAL DAILY
Qty: 30 TABLET | Refills: 0 | Status: SHIPPED | OUTPATIENT
Start: 2019-12-03 | End: 2020-01-13

## 2019-12-02 RX ORDER — METOPROLOL TARTRATE 37.5 MG/1
37.5 TABLET, FILM COATED ORAL
Qty: 60 TABLET | Refills: 0 | Status: SHIPPED | OUTPATIENT
Start: 2019-12-02 | End: 2020-01-13

## 2019-12-02 RX ORDER — PANTOPRAZOLE SODIUM 40 MG/1
40 TABLET, DELAYED RELEASE ORAL
Qty: 60 TABLET | Refills: 0 | Status: SHIPPED | OUTPATIENT
Start: 2019-12-02 | End: 2020-01-13

## 2019-12-02 RX ORDER — POTASSIUM CHLORIDE 20 MEQ/1
40 TABLET, EXTENDED RELEASE ORAL ONCE
Status: COMPLETED | OUTPATIENT
Start: 2019-12-02 | End: 2019-12-02

## 2019-12-02 RX ORDER — ATORVASTATIN CALCIUM 10 MG/1
10 TABLET, FILM COATED ORAL NIGHTLY
Qty: 30 TABLET | Refills: 0 | Status: SHIPPED | OUTPATIENT
Start: 2019-12-02 | End: 2020-01-13

## 2019-12-02 NOTE — DISCHARGE SUMMARY
General Medicine Discharge Summary     Patient ID:  Cyndee Kang  66year old  10/10/1945    Admit date: 11/25/2019    Discharge date and time:12/2/2019    Attending Physician: Rubio Mtz MD management per cards  - start lisinopril- titrate as able     Elevated ammonia, elevated INR - US liver w/ coarse echotexture consistent w/ infiltrative process   - unclear etiology- possible fatty liver  - GI consulted  - has no elevation in transaminases mouth 2x Daily(Beta Blocker). lisinopril 5 MG Oral Tab  Take 1 tablet (5 mg total) by mouth daily. Pantoprazole Sodium 40 MG Oral Tab EC  Take 1 tablet (40 mg total) by mouth 2 (two) times daily before meals.       STOP taking these medications    HYD

## 2019-12-02 NOTE — HOME CARE LIAISON
Received referral from [facility]. Met with pt at the bedside. Pt is agreeable to [facility] services at d/c. Brochure and liaison card provided. Any pt questions addressed. Will follow.

## 2019-12-02 NOTE — OCCUPATIONAL THERAPY NOTE
OCCUPATIONAL THERAPY TREATMENT NOTE - INPATIENT     Room Number: 8261/0775-F  Session: 2   Number of Visits to Meet Established Goals: 5    Presenting Problem: AMS, sepsis secondary to PNA , somnolence     History related to current admission: Pt is 74 yea bedpan or urinal? : A Little  -   Putting on and taking off regular upper body clothing?: A Little  -   Taking care of personal grooming such as brushing teeth?: A Little  -   Eating meals?: None    AM-PAC Score:  Score: 19  Approx Degree of Impairment: 42

## 2019-12-02 NOTE — PROGRESS NOTES
[facility]  Progress Note    Alexis Jin Patient Status:  Inpatient    10/10/1945 MRN [id number]   [facility] 2NE-A Attending Thierry Lovell MD   Hosp Day # 6 PCP Jame Gilliam MD     Subjective:   No chest yahaira gallops. No click is appreciated. PMI is nondisplaced with a normal apical impulse. Pulmonary:  Lungs are clear to auscultation bilaterally. There are no focal rales, rhonchi, or wheezes. Good air movement is noted throughout both lung fields.    Abd 11/27/19  1540 11/28/19  0640   TROP  --  1.910*  --  1.660* 1.400*     --  97  --  74         Tele: SR    Diagnostics:    Echo:     Conclusions:     1.  Procedure narrative: Transthoracic echocardiography for diagnosis and     ventricular function ev NL LV function on Echo.  42% on SPECT. -Apical HK suggested  5. Mental status changes / Encephalopathy    -Improved               -?Narcotic related               -Ammonia elevated  6. HTN  7. H+N CA               -S/P Chemo / XRT  8.   Pas

## 2019-12-02 NOTE — PHYSICAL THERAPY NOTE
PHYSICAL THERAPY TREATMENT NOTE - INPATIENT    Room Number: 6054/3614-L     Session: 1   Number of Visits to Meet Established Goals: 5    Presenting Problem: confusion, weakness    Problem List  Principal Problem:    Somnolence  Active Problems:    Pneumo need...   -   Moving to and from a bed to a chair (including a wheelchair)?: None   -   Need to walk in hospital room?: None   -   Climbing 3-5 steps with a railing?: None       AM-PAC Score:  Raw Score: 24   Approx Degree of Impairment: 0%   Standardized that patients with this level of impairment may benefit from DC to home with HHPT. DISCHARGE RECOMMENDATIONS  PT Discharge Recommendations: Home with home health PT     PLAN  PT Treatment Plan: Bed mobility; Endurance; Energy conservation;Patient educat

## 2019-12-02 NOTE — PLAN OF CARE
Patient resting comfortably in bed. AOX4- occasional forgetfulness. Oxygenating 94% on RA. NSR on tele. Denies SOB at this time. Complains of mild pain to lower back controlled with PRN Tylenol. Continuing honey thick liquids and chopped diet per speech.  C Problem: NEUROLOGICAL - ADULT  Goal: Achieves stable or improved neurological status  Description  INTERVENTIONS  - Assess for and report changes in neurological status  - Initiate measures to prevent increased intracranial pressure  - Maintain blood pre hygiene including cough, deep breathe, Incentive Spirometry  - Assess the need for suctioning and perform as needed  - Assess and instruct to report SOB or any respiratory difficulty  - Respiratory Therapy support as indicated  - Manage/alleviate anxiety

## 2019-12-02 NOTE — CARDIAC REHAB
Cardiac rehab received consult for education post MI. Education completed with patient. Will hold off scheduling phase 2 CR at this time. Patient plans to go to [facility] at [place].

## 2019-12-02 NOTE — PROGRESS NOTES
Pulmonary Progress Note        NAME: Jessica Loredo - ROOM: 3334/8113-S - MRN: [id number] - Age: 66year old - : 10/10/1945        Last 24hrs: No events overnight, feeling better, ready to go home    OBJECTIVE:   19  2354 19  0 TBIL, DBIL, TPROT    Invalid input(s): ARTERIALPH, ARTERIALPO2, ARTERIALPCO2, ARTERIALHCO3    No results for input(s): BNP in the last 72 hours.     Invalid input(s): TROPI    INR   Date/Time Value Ref Range Status   11/27/2019 05:02 AM 1.38 (H) 0.90 - 1.10

## 2019-12-02 NOTE — PROGRESS NOTES
IV removed, telemetry removed. Discharge teaching reviewed with patient including follow up appointments and medications. Printed prescriptions handed to patient. All questions addressed and answered. Patient discharged to home with family.

## 2019-12-02 NOTE — CM/SW NOTE
Per bedside RN this morning, pt is doing well, anticipating discharge to home today. Seen by PT already early today, recommends [facility]. Saw pt who feels good and wants to go home.  Lives with wife and two adult sons, able to ambulate on own, was driving at

## 2019-12-02 NOTE — SLP NOTE
SPEECH DAILY NOTE - INPATIENT    ASSESSMENT & PLAN   ASSESSMENT  Pt seen for dysphagia tx to assess tolerance with recommended diet, ensure proper utilization of aspiration precautions and provide pt/family education.   Pt found sitting up in chair alert an Goal #2 The patient/family/caregiver will demonstrate understanding and implementation of aspiration precautions and swallow strategies independently over 3 session(s).   Met   Goal #3 The patient will utilize compensatory strategies as outlined by  VFSS

## 2019-12-06 NOTE — CDS QUERY
Complains of sore throat Uncertain Diagnosis  CLINICAL DOCUMENTATION CLARIFICATION FORM  Dear Doctor:  Clinical information (provided below) indicates an unknown status of a documented diagnosis.  For accurate ICD-10-CM code assignment to reflect severity of illness and risk of mor

## 2019-12-09 PROBLEM — G31.9 BRAIN ATROPHY (HCC): Status: ACTIVE | Noted: 2019-12-09

## 2019-12-09 PROBLEM — I21.4 NSTEMI (NON-ST ELEVATED MYOCARDIAL INFARCTION) (HCC): Status: ACTIVE | Noted: 2019-12-09

## 2019-12-09 PROBLEM — I77.9 CAROTID ARTERY DISEASE (HCC): Status: ACTIVE | Noted: 2019-12-09

## 2019-12-09 PROBLEM — I25.5 ISCHEMIC CARDIOMYOPATHY: Status: ACTIVE | Noted: 2019-12-09

## 2019-12-09 PROBLEM — I77.1 TORTUOUS AORTA (HCC): Status: ACTIVE | Noted: 2019-12-09

## 2020-01-12 PROBLEM — J18.9 PNEUMONIA OF LEFT UPPER LOBE DUE TO INFECTIOUS ORGANISM: Status: RESOLVED | Noted: 2019-11-26 | Resolved: 2020-01-12

## 2020-03-11 PROBLEM — I25.2 HISTORY OF MI (MYOCARDIAL INFARCTION): Status: ACTIVE | Noted: 2020-03-11

## 2020-03-11 PROBLEM — I21.4 NSTEMI (NON-ST ELEVATED MYOCARDIAL INFARCTION) (HCC): Status: RESOLVED | Noted: 2019-12-09 | Resolved: 2020-03-11

## 2021-04-18 NOTE — PROGRESS NOTES
Left message with wife for patient to call our office I have reviewed and confirmed nurses' notes... No

## 2021-04-26 PROBLEM — I65.29 CAROTID STENOSIS: Status: ACTIVE | Noted: 2021-04-26

## 2021-04-26 PROBLEM — F11.21 OPIOID DEPENDENCE IN REMISSION (HCC): Status: ACTIVE | Noted: 2021-04-26

## 2021-04-26 PROBLEM — I77.9 CAROTID ARTERY DISEASE (HCC): Status: RESOLVED | Noted: 2019-12-09 | Resolved: 2021-04-26

## 2021-04-26 PROBLEM — I77.89 ECTASIA OF ARTERY (HCC): Status: ACTIVE | Noted: 2021-04-26

## 2021-05-25 ENCOUNTER — APPOINTMENT (OUTPATIENT)
Dept: CT IMAGING | Facility: HOSPITAL | Age: 76
DRG: 270 | End: 2021-05-25
Attending: EMERGENCY MEDICINE
Payer: MEDICARE

## 2021-05-25 ENCOUNTER — APPOINTMENT (OUTPATIENT)
Dept: NUCLEAR MEDICINE | Facility: HOSPITAL | Age: 76
DRG: 270 | End: 2021-05-25
Attending: EMERGENCY MEDICINE
Payer: MEDICARE

## 2021-05-25 ENCOUNTER — HOSPITAL ENCOUNTER (INPATIENT)
Facility: HOSPITAL | Age: 76
LOS: 10 days | Discharge: HOME HEALTH | DRG: 270 | End: 2021-06-04
Attending: EMERGENCY MEDICINE | Admitting: INTERNAL MEDICINE
Payer: MEDICARE

## 2021-05-25 ENCOUNTER — APPOINTMENT (OUTPATIENT)
Dept: GENERAL RADIOLOGY | Facility: HOSPITAL | Age: 76
DRG: 270 | End: 2021-05-25
Attending: EMERGENCY MEDICINE
Payer: MEDICARE

## 2021-05-25 ENCOUNTER — APPOINTMENT (OUTPATIENT)
Dept: INTERVENTIONAL RADIOLOGY/VASCULAR | Facility: HOSPITAL | Age: 76
DRG: 270 | End: 2021-05-25
Attending: INTERNAL MEDICINE
Payer: MEDICARE

## 2021-05-25 DIAGNOSIS — C49.A2 GASTROINTESTINAL STROMAL TUMOR (GIST) OF STOMACH (HCC): ICD-10-CM

## 2021-05-25 DIAGNOSIS — I21.4 NSTEMI (NON-ST ELEVATED MYOCARDIAL INFARCTION) (HCC): ICD-10-CM

## 2021-05-25 DIAGNOSIS — R06.00 DYSPNEA, UNSPECIFIED TYPE: ICD-10-CM

## 2021-05-25 DIAGNOSIS — D64.9 ANEMIA, UNSPECIFIED TYPE: ICD-10-CM

## 2021-05-25 DIAGNOSIS — R77.8 ELEVATED TROPONIN: ICD-10-CM

## 2021-05-25 DIAGNOSIS — K31.89 GASTRIC MASS: ICD-10-CM

## 2021-05-25 DIAGNOSIS — K92.2 GASTROINTESTINAL HEMORRHAGE, UNSPECIFIED GASTROINTESTINAL HEMORRHAGE TYPE: Primary | ICD-10-CM

## 2021-05-25 PROBLEM — R79.89 AZOTEMIA: Status: ACTIVE | Noted: 2021-05-25

## 2021-05-25 PROBLEM — R79.89 ELEVATED TROPONIN: Status: ACTIVE | Noted: 2021-05-25

## 2021-05-25 PROCEDURE — 83540 ASSAY OF IRON: CPT | Performed by: INTERNAL MEDICINE

## 2021-05-25 PROCEDURE — 85379 FIBRIN DEGRADATION QUANT: CPT | Performed by: EMERGENCY MEDICINE

## 2021-05-25 PROCEDURE — 99152 MOD SED SAME PHYS/QHP 5/>YRS: CPT

## 2021-05-25 PROCEDURE — 84484 ASSAY OF TROPONIN QUANT: CPT | Performed by: INTERNAL MEDICINE

## 2021-05-25 PROCEDURE — 80053 COMPREHEN METABOLIC PANEL: CPT | Performed by: EMERGENCY MEDICINE

## 2021-05-25 PROCEDURE — 30233N1 TRANSFUSION OF NONAUTOLOGOUS RED BLOOD CELLS INTO PERIPHERAL VEIN, PERCUTANEOUS APPROACH: ICD-10-PCS | Performed by: INTERNAL MEDICINE

## 2021-05-25 PROCEDURE — 82140 ASSAY OF AMMONIA: CPT | Performed by: EMERGENCY MEDICINE

## 2021-05-25 PROCEDURE — 82728 ASSAY OF FERRITIN: CPT | Performed by: INTERNAL MEDICINE

## 2021-05-25 PROCEDURE — C9113 INJ PANTOPRAZOLE SODIUM, VIA: HCPCS | Performed by: INTERNAL MEDICINE

## 2021-05-25 PROCEDURE — 86920 COMPATIBILITY TEST SPIN: CPT

## 2021-05-25 PROCEDURE — 02JA3ZZ INSPECTION OF HEART, PERCUTANEOUS APPROACH: ICD-10-PCS | Performed by: INTERNAL MEDICINE

## 2021-05-25 PROCEDURE — 86850 RBC ANTIBODY SCREEN: CPT | Performed by: EMERGENCY MEDICINE

## 2021-05-25 PROCEDURE — 82378 CARCINOEMBRYONIC ANTIGEN: CPT | Performed by: PHYSICIAN ASSISTANT

## 2021-05-25 PROCEDURE — 93454 CORONARY ARTERY ANGIO S&I: CPT

## 2021-05-25 PROCEDURE — 93010 ELECTROCARDIOGRAM REPORT: CPT

## 2021-05-25 PROCEDURE — B2111ZZ FLUOROSCOPY OF MULTIPLE CORONARY ARTERIES USING LOW OSMOLAR CONTRAST: ICD-10-PCS | Performed by: INTERNAL MEDICINE

## 2021-05-25 PROCEDURE — C9113 INJ PANTOPRAZOLE SODIUM, VIA: HCPCS | Performed by: EMERGENCY MEDICINE

## 2021-05-25 PROCEDURE — 82272 OCCULT BLD FECES 1-3 TESTS: CPT

## 2021-05-25 PROCEDURE — 85025 COMPLETE CBC W/AUTO DIFF WBC: CPT | Performed by: EMERGENCY MEDICINE

## 2021-05-25 PROCEDURE — 74177 CT ABD & PELVIS W/CONTRAST: CPT | Performed by: EMERGENCY MEDICINE

## 2021-05-25 PROCEDURE — 71045 X-RAY EXAM CHEST 1 VIEW: CPT | Performed by: EMERGENCY MEDICINE

## 2021-05-25 PROCEDURE — 96361 HYDRATE IV INFUSION ADD-ON: CPT

## 2021-05-25 PROCEDURE — 96374 THER/PROPH/DIAG INJ IV PUSH: CPT

## 2021-05-25 PROCEDURE — 99285 EMERGENCY DEPT VISIT HI MDM: CPT

## 2021-05-25 PROCEDURE — 33967 INSERT I-AORT PERCUT DEVICE: CPT

## 2021-05-25 PROCEDURE — 78278 ACUTE GI BLOOD LOSS IMAGING: CPT | Performed by: EMERGENCY MEDICINE

## 2021-05-25 PROCEDURE — 86900 BLOOD TYPING SEROLOGIC ABO: CPT | Performed by: EMERGENCY MEDICINE

## 2021-05-25 PROCEDURE — 84484 ASSAY OF TROPONIN QUANT: CPT | Performed by: EMERGENCY MEDICINE

## 2021-05-25 PROCEDURE — 93005 ELECTROCARDIOGRAM TRACING: CPT

## 2021-05-25 PROCEDURE — 78830 RP LOCLZJ TUM SPECT W/CT 1: CPT | Performed by: EMERGENCY MEDICINE

## 2021-05-25 PROCEDURE — 86901 BLOOD TYPING SEROLOGIC RH(D): CPT | Performed by: EMERGENCY MEDICINE

## 2021-05-25 PROCEDURE — 70450 CT HEAD/BRAIN W/O DYE: CPT | Performed by: EMERGENCY MEDICINE

## 2021-05-25 PROCEDURE — 83690 ASSAY OF LIPASE: CPT | Performed by: EMERGENCY MEDICINE

## 2021-05-25 PROCEDURE — 85610 PROTHROMBIN TIME: CPT | Performed by: EMERGENCY MEDICINE

## 2021-05-25 PROCEDURE — 83550 IRON BINDING TEST: CPT | Performed by: INTERNAL MEDICINE

## 2021-05-25 PROCEDURE — 92920 PRQ TRLUML C ANGIOP 1ART&/BR: CPT

## 2021-05-25 PROCEDURE — 5A02210 ASSISTANCE WITH CARDIAC OUTPUT USING BALLOON PUMP, CONTINUOUS: ICD-10-PCS | Performed by: INTERNAL MEDICINE

## 2021-05-25 PROCEDURE — 85018 HEMOGLOBIN: CPT | Performed by: INTERNAL MEDICINE

## 2021-05-25 PROCEDURE — 4A023N7 MEASUREMENT OF CARDIAC SAMPLING AND PRESSURE, LEFT HEART, PERCUTANEOUS APPROACH: ICD-10-PCS | Performed by: INTERNAL MEDICINE

## 2021-05-25 PROCEDURE — 36430 TRANSFUSION BLD/BLD COMPNT: CPT

## 2021-05-25 PROCEDURE — 85730 THROMBOPLASTIN TIME PARTIAL: CPT | Performed by: EMERGENCY MEDICINE

## 2021-05-25 RX ORDER — SODIUM CHLORIDE 9 MG/ML
1000 INJECTION, SOLUTION INTRAVENOUS ONCE
Status: COMPLETED | OUTPATIENT
Start: 2021-05-25 | End: 2021-05-25

## 2021-05-25 RX ORDER — SODIUM CHLORIDE 9 MG/ML
INJECTION, SOLUTION INTRAVENOUS CONTINUOUS
Status: ACTIVE | OUTPATIENT
Start: 2021-05-25 | End: 2021-05-25

## 2021-05-25 RX ORDER — NOREPINEPHRINE BITARTRATE 1 MG/ML
INJECTION, SOLUTION INTRAVENOUS
Status: COMPLETED
Start: 2021-05-25 | End: 2021-05-25

## 2021-05-25 RX ORDER — BIVALIRUDIN 250 MG/5ML
INJECTION, POWDER, LYOPHILIZED, FOR SOLUTION INTRAVENOUS
Status: COMPLETED
Start: 2021-05-25 | End: 2021-05-25

## 2021-05-25 RX ORDER — LIDOCAINE HYDROCHLORIDE 10 MG/ML
INJECTION, SOLUTION EPIDURAL; INFILTRATION; INTRACAUDAL; PERINEURAL
Status: COMPLETED
Start: 2021-05-25 | End: 2021-05-25

## 2021-05-25 RX ORDER — METOCLOPRAMIDE HYDROCHLORIDE 5 MG/ML
10 INJECTION INTRAMUSCULAR; INTRAVENOUS EVERY 8 HOURS PRN
Status: DISCONTINUED | OUTPATIENT
Start: 2021-05-25 | End: 2021-05-29

## 2021-05-25 RX ORDER — HEPARIN SODIUM 5000 [USP'U]/ML
INJECTION, SOLUTION INTRAVENOUS; SUBCUTANEOUS
Status: COMPLETED
Start: 2021-05-25 | End: 2021-05-25

## 2021-05-25 RX ORDER — SODIUM CHLORIDE 9 MG/ML
INJECTION, SOLUTION INTRAVENOUS
Status: DISCONTINUED | OUTPATIENT
Start: 2021-05-26 | End: 2021-05-25 | Stop reason: HOSPADM

## 2021-05-25 RX ORDER — NITROGLYCERIN 20 MG/100ML
INJECTION INTRAVENOUS
Status: COMPLETED
Start: 2021-05-25 | End: 2021-05-25

## 2021-05-25 RX ORDER — DOPAMINE HYDROCHLORIDE 320 MG/100ML
INJECTION, SOLUTION INTRAVENOUS
Status: COMPLETED
Start: 2021-05-25 | End: 2021-05-25

## 2021-05-25 RX ORDER — SODIUM CHLORIDE 9 MG/ML
INJECTION, SOLUTION INTRAVENOUS CONTINUOUS
Status: DISCONTINUED | OUTPATIENT
Start: 2021-05-25 | End: 2021-05-29

## 2021-05-25 RX ORDER — ONDANSETRON 2 MG/ML
4 INJECTION INTRAMUSCULAR; INTRAVENOUS EVERY 4 HOURS PRN
Status: DISCONTINUED | OUTPATIENT
Start: 2021-05-25 | End: 2021-05-25

## 2021-05-25 RX ORDER — ACETAMINOPHEN 325 MG/1
650 TABLET ORAL EVERY 6 HOURS PRN
Status: DISCONTINUED | OUTPATIENT
Start: 2021-05-25 | End: 2021-05-29

## 2021-05-25 RX ORDER — ATORVASTATIN CALCIUM 10 MG/1
10 TABLET, FILM COATED ORAL NIGHTLY
Status: DISCONTINUED | OUTPATIENT
Start: 2021-05-25 | End: 2021-05-27

## 2021-05-25 RX ORDER — ONDANSETRON 2 MG/ML
4 INJECTION INTRAMUSCULAR; INTRAVENOUS EVERY 6 HOURS PRN
Status: DISCONTINUED | OUTPATIENT
Start: 2021-05-25 | End: 2021-05-29

## 2021-05-25 RX ORDER — MIDAZOLAM HYDROCHLORIDE 1 MG/ML
INJECTION INTRAMUSCULAR; INTRAVENOUS
Status: COMPLETED
Start: 2021-05-25 | End: 2021-05-25

## 2021-05-25 NOTE — ED INITIAL ASSESSMENT (HPI)
Pt c/o bilateral leg weakness, shortness of breath and chest burning since Friday. Reports unable to eat or drink for twelve hours.      Reports similar episode thanksgiving 2019

## 2021-05-25 NOTE — H&P
[facility] Hospitalist H&P       CC: Patient presents with:  Difficulty Breathing       PCP: Radha Bae MD    History of Present Illness:  Mr. Serg Tyler is a 67 yo male with PMH of DVT, prior head and neck cancer, HTN, HLD, prior NSTEMI (Nov 2019, managed medical Rfl: 0  aspirin 81 MG Oral Chew Tab, Chew 1 tablet (81 mg total) by mouth daily. , Disp: 30 tablet, Rfl: 0          Soc Hx  Social History    Tobacco Use      Smoking status: Former Smoker      Smokeless tobacco: Never Used      Tobacco comment: >10 years reviewed no consolidation    Radiology: CT BRAIN OR HEAD (52900)    Result Date: 5/25/2021  PROCEDURE:  CT BRAIN OR HEAD (40799)  COMPARISON:  [facility] E CT, CT BRAIN OR HEAD (39190), 11/25/2019, 9:14 PM.  INDICATIONS:  shortness of breath, burning in chest AP PORTABLE  (CPT=71045), 11/26/2019, 5:03 AM.  [facility] , XR, XR CHEST AP PORTABLE  (CPT=71045), 11/25/2019, 8:03 PM.  INDICATIONS:  shortness of breath, burning in chest similar to acid reflux, having trouble with balance. denies chest pain. , 98% room air, significant focal lesion. BILIARY:  No visible dilatation or calcification. PANCREAS:  No lesion, fluid collection, ductal dilatation, or atrophy. SPLEEN:  No enlargement or focal lesion.   KIDNEYS:  Exophytic 1.3 cm cyst lower pole right kidney is noted bleeding  - getting 1 unit pRBCs  - NM scan  - GI c/s  - IV PPI    # Generalized weakness  - suspect due to above  - transfuse, monitor Hgb  - will need PT/OT eval pending clinical course    # hx of  Head an neck cancer  - hx of prior  - outpatient f/u

## 2021-05-25 NOTE — PLAN OF CARE
NURSING ADMISSION NOTE      Patient admitted via Cart  Oriented to room. Safety precautions initiated. Bed in low position. Call light in reach. Admission navigator completed. Skin check completed with Jerome Mcgraw RN. Pt AOx3.  C/o mid chest yahaira

## 2021-05-25 NOTE — ED PROVIDER NOTES
Patient Seen in: [facility] Emergency Department      History   Patient presents with:  Difficulty Breathing    Stated Complaint: shortness of breath, burning in chest similar to acid reflux, having trouble wi*    HPI/Subjective:   HPI    Patient is Smoker      Smokeless tobacco: Never Used      Tobacco comment: >10 years    Vaping Use      Vaping Use: Never used    Alcohol use: No      Alcohol/week: 0.0 standard drinks    Drug use:  No             Review of Systems    Positive for stated complaint: sh extremities. NEURO: Patient is awake, alert and oriented to time place and person. Motor strength is 5 over 5 in all 4 extremities. There are no gross motor or sensory deficits appreciated. Cranial nerves II through XII are intact.   Patient answering all CFRR)[[id number]]                               Final result               ANTIBODY NUDWHS[[id number]]                                  Final result                 Please view results for these tests on the individual orders.    ABORH (BLOOD TYPE)   ANTIBODY 12:56 PM     Finalized by (CST): Marium Winchester MD on 5/25/2021 at 1:02 PM              [facility]        13:42 patient sitting back and breathing easily in no apparent stress this time. Patient feeling better at this time.   Will admit to the hospital for further Impression:  Gastrointestinal hemorrhage, unspecified gastrointestinal hemorrhage type  (primary encounter diagnosis)  Anemia, unspecified type  Dyspnea, unspecified type  Elevated troponin     Disposition:  Admit  5/25/2021  1:29 pm    Follow-up:  No foll

## 2021-05-25 NOTE — ED QUICK NOTES
Pt updated on POC,  Awaiting CT for further POC. No noted acute distress. Will continue to monitor. PRBC continues to infuse.

## 2021-05-25 NOTE — PROGRESS NOTES
05/25/21 1812 05/25/21 1813 05/25/21 1814   Vital Signs   Pulse 112 113 115   Heart Rate Source Monitor  --   --    Resp 22  --   --    Respiratory Quality Normal  --   --    /69 105/68 101/56   MAP (mmHg) 78 76 67   BP Location Left arm Left arm

## 2021-05-25 NOTE — CONSULTS
[facility] Cardiology  Consultation Note      Savanah Maher Patient Status:  Emergency    10/10/1945 MRN [id number]   Location [facility] Attending Saeid Pichardo MD   Hosp Day # 0 PCP Sammi Hodgkins, MD     Reason fo Hg 6.4 (prior 9.2). Poor food/fluid intake for past 12 hours. +abdominal/burning pain. No confusion (in comparison to 2019). ECG shows SR with prior inferior infarct, no sig ST changes. Trop 1.07 (peak '19: 1.9).       Medications:  No current facility- Exam:  Blood pressure 99/72, pulse 104, temperature 98.5 °F (36.9 °C), temperature source Temporal, resp. rate 20, height 5' 5\" (1.651 m), weight 170 lb (77.1 kg), SpO2 95 %.   Temp (24hrs), Av.3 °F (36.8 °C), Min:97.9 °F (36.6 °C), Max:98.6 °F (37 °C) contact me if you have any questions.     Marilia Landaverde MD  5/25/2021  1:50 PM

## 2021-05-26 ENCOUNTER — ANESTHESIA EVENT (OUTPATIENT)
Dept: ENDOSCOPY | Facility: HOSPITAL | Age: 76
DRG: 270 | End: 2021-05-26
Payer: MEDICARE

## 2021-05-26 ENCOUNTER — APPOINTMENT (OUTPATIENT)
Dept: CV DIAGNOSTICS | Facility: HOSPITAL | Age: 76
DRG: 270 | End: 2021-05-26
Attending: INTERNAL MEDICINE
Payer: MEDICARE

## 2021-05-26 PROBLEM — R06.00 DYSPNEA, UNSPECIFIED TYPE: Status: RESOLVED | Noted: 2021-05-25 | Resolved: 2021-05-26

## 2021-05-26 PROCEDURE — 93306 TTE W/DOPPLER COMPLETE: CPT | Performed by: INTERNAL MEDICINE

## 2021-05-26 PROCEDURE — 85025 COMPLETE CBC W/AUTO DIFF WBC: CPT | Performed by: INTERNAL MEDICINE

## 2021-05-26 PROCEDURE — C9113 INJ PANTOPRAZOLE SODIUM, VIA: HCPCS | Performed by: INTERNAL MEDICINE

## 2021-05-26 PROCEDURE — 36430 TRANSFUSION BLD/BLD COMPNT: CPT

## 2021-05-26 PROCEDURE — 85018 HEMOGLOBIN: CPT | Performed by: INTERNAL MEDICINE

## 2021-05-26 PROCEDURE — 84484 ASSAY OF TROPONIN QUANT: CPT | Performed by: INTERNAL MEDICINE

## 2021-05-26 PROCEDURE — 80048 BASIC METABOLIC PNL TOTAL CA: CPT | Performed by: INTERNAL MEDICINE

## 2021-05-26 RX ORDER — SODIUM CHLORIDE 9 MG/ML
INJECTION, SOLUTION INTRAVENOUS ONCE
Status: COMPLETED | OUTPATIENT
Start: 2021-05-26 | End: 2021-05-26

## 2021-05-26 NOTE — PROGRESS NOTES
Called about troponin of 10. Patient with ongoing \"chest burning. \"  EKG shows anterior ST elevation. Discussed with Dr. Lyndon Calero. Spoke with patient over the phone. Urgent cardiac cath advised.   He agrees though I had some concerns that he could not hear

## 2021-05-26 NOTE — PROGRESS NOTES
[facility] Hospitalist note    PCP: Alexa Rey MD    Chief Complaint:  LH, melena/anemia, stemi    SUBJECTIVE:  Trop from 1-->10-->39  ekg with anterior st elevation  Went for cardiac cath last night- found to have multivessel occlusive CAD with heavily calc 05/25/21  0914 05/25/21  1820 05/26/21  0452   TROP 1.070* 10.300* 39.800*         Meds:     • polyethylene glycol  4,000 mL Oral Once   • atorvastatin  10 mg Oral Nightly   • pantoprazole (PROTONIX) IV push  40 mg Intravenous Q12H     • sodium chloride 83

## 2021-05-26 NOTE — OPERATIVE REPORT
Called to ask if pt changed his mind    He is again refusing endoscopic eval    Hg improved on recheck, troponins elevated     He can have clears tonight, would make npo at [place]    Sabino Duvall MD  [address] [place] [address] Gastroenterology

## 2021-05-26 NOTE — PROGRESS NOTES
[facility]  Progress Note    Tanika Rodriguez Patient Status:  Inpatient    10/10/1945 MRN [id number]   [facility] 6NE-A Attending Gale Locke MD   [facility] Day # 1 PCP Alexis Pritchett MD     Subjective:  Tanika Rodriguez is a(n) 66 yea Lab Results   Component Value Date    CREATSERUM 1.09 05/26/2021    BUN 22 05/26/2021     05/26/2021    K 4.3 05/26/2021     05/26/2021    CO2 22.0 05/26/2021     05/26/2021    CA 8.4 05/26/2021       Lab Results   Component Value questions and concerns were addressed and were agreeable to the plan as listed      Alejandra Trotter

## 2021-05-26 NOTE — PLAN OF CARE
Called received by Dr. Yoni River. Pt is refusing EGD/colonoscopy. Md aware. Notified him of repeat hgb 7.4. Trop endorsed to oncoming RN.

## 2021-05-26 NOTE — PLAN OF CARE
Patient VSS. Complaints of slightly better chest pain/burning. Balloon pump stable with no complications. Dr. Karina Wilks confirmed with no orders for anticoagulation. 1:2 with good augmentation. Patient Pass swallow. Levo gtt continues.

## 2021-05-26 NOTE — PROCEDURES
PCI of the LAD  Lesion Characteristics-mildly heavily torturous, heavily calcified. Type non-C lesion. Pre-intervention stenosis 100% %, Post intervention stenosis 100% %. Pre DEBORA 0, Post DEBORA 0.     Guide-EBU 3.5  Wire-BMW then run-through  The EBU cat

## 2021-05-26 NOTE — PROGRESS NOTES
I had discussed his care w/ Dr Cruzito Landaverde yesterday and today    Pt expressed willingness to have endoscopy tomorrow w/ Dr Cruzito Landaverde.   I visited with him again today, again discussed risk, benefit, alternatives, limitations to egd/colonoscopy w/ him as well as risk giv

## 2021-05-26 NOTE — PROGRESS NOTES
5/26/ 8am.  Not from last night was progress note, not op note.  That note was cut-pasted below as a progress note w/ that date/time      5/25/21 7 pm note    Called to ask if pt changed his mind     He is again refusing endoscopic eval     Hg improved on r

## 2021-05-26 NOTE — PLAN OF CARE
Received this a.m., awake with IABP, hemodynamically stable, complaining of chest burning,  at bedside, order received to remove IABP. 1 unit of PRBC's transfused, tolerated. IABP removed, manual pressure held until hemostasis achieved.  Plan for uppe

## 2021-05-26 NOTE — PROGRESS NOTES
ADDENDUM:  The patient was personally seen and examined by me. I agree with the note written below with the following comments:    S: cath lab called in for risin troponin, ECG with new ST elevation anteriorly.   LHC demonstrated chronically occluded LAD, s now.  Needs GI w/u, but pt refusing, Dr. Caroline Canales to discuss further w/ Dr. Dorota Purdy. Nuc med scan showed no active GIB. 2. Trop 40 and w/ multivessel CAD on cath. Remove IABP today. Await echo  3. Maintain Levo 5mcg    Subjective:   The patient denies any c Lab Results   Component Value Date    INR 1.21 (H) 05/25/2021    INR 1.38 (H) 11/27/2019    INR 1.47 (H) 11/26/2019     Lab Results   Component Value Date     05/26/2021    K 4.3 05/26/2021     05/26/2021    CO2 22.0 05/26/2021    BUN 22 05

## 2021-05-26 NOTE — PROCEDURES
[facility] Cardiac Cath Procedure Note    Nasir Davalos Patient Status:  Inpatient    10/10/1945 MRN [id number]   Location [address] [facility] Attending Tima Stuart MD   Hosp Day # 0 PCP Veronica Kramer MD       Cardiologist: Preston Baez balloon pump. Because of hemodynamic instability a balloon pump was inserted in the right femoral artery. The indication was reviewed with his son Italia Mc over the phone. The situation was reviewed with him as well.   The wire was advanced in the descendi

## 2021-05-26 NOTE — ANESTHESIA PREPROCEDURE EVALUATION
PRE-OP EVALUATION    Patient Name: Nicole Morales    Admit Diagnosis: Elevated troponin [R77.8]  Gastrointestinal hemorrhage, unspecified gastrointestinal hemorrhage type [K92.2]  Anemia, unspecified type [D64.9]  Dyspnea, unspecified type [R06.00]    Pre- [COMPLETED] Bivalirudin (ANGIOMAX) 250 MG injection, , ,   [COMPLETED] Bivalirudin (ANGIOMAX) 250 MG injection, , ,   [COMPLETED] DOPamine in D5W (INTROPIN) 800 mg/250 ml premix infusion, , ,   [COMPLETED] Norepinephrine Bitartrate (LEVOPHED) 1 MG/ML inj date: 1961        Years since quittin.0      Smokeless tobacco: Never Used      Tobacco comment: >10 years    Alcohol use: No      Alcohol/week: 0.0 standard drinks      Drug use: No     Available pre-op labs reviewed.   Lab Results   Component Va

## 2021-05-27 ENCOUNTER — APPOINTMENT (OUTPATIENT)
Dept: CT IMAGING | Facility: HOSPITAL | Age: 76
DRG: 270 | End: 2021-05-27
Attending: INTERNAL MEDICINE
Payer: MEDICARE

## 2021-05-27 ENCOUNTER — ANESTHESIA EVENT (OUTPATIENT)
Dept: ENDOSCOPY | Facility: HOSPITAL | Age: 76
DRG: 270 | End: 2021-05-27
Payer: MEDICARE

## 2021-05-27 ENCOUNTER — ANESTHESIA (OUTPATIENT)
Dept: ENDOSCOPY | Facility: HOSPITAL | Age: 76
DRG: 270 | End: 2021-05-27
Payer: MEDICARE

## 2021-05-27 PROCEDURE — 80053 COMPREHEN METABOLIC PANEL: CPT | Performed by: INTERNAL MEDICINE

## 2021-05-27 PROCEDURE — 85018 HEMOGLOBIN: CPT | Performed by: INTERNAL MEDICINE

## 2021-05-27 PROCEDURE — 0D718ZZ DILATION OF UPPER ESOPHAGUS, VIA NATURAL OR ARTIFICIAL OPENING ENDOSCOPIC: ICD-10-PCS | Performed by: INTERNAL MEDICINE

## 2021-05-27 PROCEDURE — C9113 INJ PANTOPRAZOLE SODIUM, VIA: HCPCS | Performed by: INTERNAL MEDICINE

## 2021-05-27 PROCEDURE — 85025 COMPLETE CBC W/AUTO DIFF WBC: CPT | Performed by: INTERNAL MEDICINE

## 2021-05-27 PROCEDURE — 74178 CT ABD&PLV WO CNTR FLWD CNTR: CPT | Performed by: INTERNAL MEDICINE

## 2021-05-27 PROCEDURE — 0DJD8ZZ INSPECTION OF LOWER INTESTINAL TRACT, VIA NATURAL OR ARTIFICIAL OPENING ENDOSCOPIC: ICD-10-PCS | Performed by: INTERNAL MEDICINE

## 2021-05-27 RX ORDER — LISINOPRIL 2.5 MG/1
2.5 TABLET ORAL DAILY
Status: DISCONTINUED | OUTPATIENT
Start: 2021-05-27 | End: 2021-05-27

## 2021-05-27 RX ORDER — LIDOCAINE HYDROCHLORIDE 10 MG/ML
INJECTION, SOLUTION EPIDURAL; INFILTRATION; INTRACAUDAL; PERINEURAL AS NEEDED
Status: DISCONTINUED | OUTPATIENT
Start: 2021-05-27 | End: 2021-05-27 | Stop reason: SURG

## 2021-05-27 RX ORDER — LISINOPRIL 2.5 MG/1
2.5 TABLET ORAL DAILY
Status: DISCONTINUED | OUTPATIENT
Start: 2021-05-28 | End: 2021-06-03

## 2021-05-27 RX ORDER — CARVEDILOL 3.12 MG/1
3.12 TABLET ORAL 2 TIMES DAILY WITH MEALS
Status: DISCONTINUED | OUTPATIENT
Start: 2021-05-28 | End: 2021-06-01

## 2021-05-27 RX ORDER — ATORVASTATIN CALCIUM 40 MG/1
40 TABLET, FILM COATED ORAL NIGHTLY
Status: DISCONTINUED | OUTPATIENT
Start: 2021-05-27 | End: 2021-06-04

## 2021-05-27 RX ORDER — POTASSIUM CHLORIDE 1.5 G/1.77G
40 POWDER, FOR SOLUTION ORAL EVERY 4 HOURS
Status: COMPLETED | OUTPATIENT
Start: 2021-05-27 | End: 2021-05-28

## 2021-05-27 RX ORDER — CARVEDILOL 3.12 MG/1
3.12 TABLET ORAL 2 TIMES DAILY WITH MEALS
Status: DISCONTINUED | OUTPATIENT
Start: 2021-05-27 | End: 2021-05-27

## 2021-05-27 RX ORDER — NALOXONE HYDROCHLORIDE 0.4 MG/ML
80 INJECTION, SOLUTION INTRAMUSCULAR; INTRAVENOUS; SUBCUTANEOUS AS NEEDED
Status: CANCELLED | OUTPATIENT
Start: 2021-05-27 | End: 2021-05-27

## 2021-05-27 RX ORDER — SODIUM CHLORIDE 9 MG/ML
INJECTION, SOLUTION INTRAVENOUS CONTINUOUS
Status: CANCELLED | OUTPATIENT
Start: 2021-05-27

## 2021-05-27 RX ADMIN — SODIUM CHLORIDE: 9 INJECTION, SOLUTION INTRAVENOUS at 07:14:00

## 2021-05-27 RX ADMIN — LIDOCAINE HYDROCHLORIDE 70 MG: 10 INJECTION, SOLUTION EPIDURAL; INFILTRATION; INTRACAUDAL; PERINEURAL at 07:18:00

## 2021-05-27 NOTE — PROGRESS NOTES
[facility] Hospitalist note    PCP: Mary Chavez MD    Chief Complaint:  LH, melena/anemia, stemi    SUBJECTIVE:  Went for egd and c-scope this morning. prox esophageal stricture was dilated and 4-5 cm ulcerated submucosal lesion in gastric body was seen.  Pan- hours.     Recent Labs   Lab 05/25/21  0914 05/25/21  1820 05/26/21  0452   TROP 1.070* 10.300* 39.800*         Meds:     • atorvastatin  10 mg Oral Nightly   • pantoprazole (PROTONIX) IV push  40 mg Intravenous Q12H     • sodium chloride Stopped (05/27/21

## 2021-05-27 NOTE — PROGRESS NOTES
[facility]  Progress Note    Tanika Rodriguez Patient Status:  Inpatient    10/10/1945 MRN [id number]   Location [address] [address] Attending Ondina Garcia MD   [facility] Day # 2 PCP Alexis Pritchett MD     Subjective:  Tanika Rodriguez is a(n --     < > = values in this interval not displayed.                  )             Assessment and Plan:  Patient Active Problem List:     HTN (hypertension)     Hearing loss     History of head and neck cancer     DJD (degenerative joint disease), cervica

## 2021-05-27 NOTE — CONSULTS
[facility]        Patient Name:  Manas Hardy   YOB: 1945   Gender:  Male   Appt Date:  5/27/2021   Provider:  Meagan Grey MD     PATIENT PROVIDERS  Primary Care Jamshid Vang MD   Phone #: [phone] Reviewed:  No current facility-administered medications on file prior to encounter. aspirin 500 MG Oral Tab, Take 500 mg by mouth daily. Not prescribed, pt taking OTC.  Reports taking 1/day for arthritic back pain, Disp: , Rfl:   atorvastatin 10 MG Oral Ta Age of Onset   • Cancer Father         pancreatic    • Other (Other) Mother    • Renal Disease Other         Sibling, Family history         Review of Systems:  · GENERAL HEALTH: + dizziness. · SKIN: no change in mole.    · HEENT: denies pain  · RESPIRATO acute surgical issues. Gastric mass most consistent with source of bleeding, but no active bleeding noted on EGD. Aspirin on hold. On PPI. -Findings and differential diagnosis were discussed with patient at length. -Plan for CT abd/pelvis today.  Obtain C

## 2021-05-27 NOTE — ANESTHESIA PREPROCEDURE EVALUATION
PRE-OP EVALUATION    Patient Name: Graham Painter    Admit Diagnosis: Elevated troponin [R77.8]  Gastrointestinal hemorrhage, unspecified gastrointestinal hemorrhage type [K92.2]  Anemia, unspecified type [D64.9]  Dyspnea, unspecified type [R06.00]    Pre- UNIT/ML injection, , ,   [COMPLETED] lidocaine (PF) (XYLOCAINE) 1 % injection, , ,   [COMPLETED] Heparin Sodium (Porcine) 5000 UNIT/ML injection, , ,   [COMPLETED] fentaNYL citrate (SUBLIMAZE) 0.05 MG/ML injection, , ,   [COMPLETED] Midazolam HCl (VERSED) Status:  Inpatient   10/10/1945 MRN [id number]  [facility] 6NE-A Attending Clayton Saavedra MD  [facility] Day # 2 PCP Mary Chavez MD     Impression:  1.  Elevated Troponin/STEMI/ischemic cardiomyopathy  -likely 2/2 profound anemia in settin 1961        Years since quittin.0      Smokeless tobacco: Never Used      Tobacco comment: >10 years    Alcohol use: No      Alcohol/week: 0.0 standard drinks      Drug use: No     Available pre-op labs reviewed.   Lab Results   Component Value Da benefits, and wishes to proceed with MAC anesthesia as reflected in the signed consent form. Plan/risks discussed with: patient          The above pre-operative evaluation represents an evaluation by Dr. Sofia Perez yesterday and an update today by myself.  Messi Gamboa

## 2021-05-27 NOTE — PROGRESS NOTES
Called pt    Discussed the esoph stricture, he is s/p chemo/radation for hent ca 2009    Reviewed egd findings w/ pt and risk of GIST.  Risk of dilation for eus discussed     Ct reviewed w/ pt as well    Case reviewed w/ Dr Alma Cole    --he is agreeable to a

## 2021-05-27 NOTE — OPERATIVE REPORT
ENDOSCOPY OPERATIVE REPORT    Patient Name:  Nevaeh Rodrigez  Medical Record #: [id number]  YOB: 1945  Date of Procedure: 5/27/2021    Preoperative Diagnosis:  Acute blood loss anemia, melena    Postoperative Diagnosis:   1) proximal esophage mucosa had an overall normal appearance. The Z-line occurred at the top of the gastric folds.   Esophagus was not tortuous proximally, mild tortuosity in lower esophagus    The gastric lumen was then entered and views of the gastric mucosa as well as Anish Josue ulcerated and may rebleed    Spoke w/ Dr Belgica Rowell as well (cardio)    Cassius Killian MD  [facility] Gastroenterology

## 2021-05-27 NOTE — ANESTHESIA POSTPROCEDURE EVALUATION
[facility] Patient Status:  Inpatient   Age/Gender 66year old male MRN [id number]   Location [id number] [place] [address] Attending Remi Ochoa MD   Hosp Day # 2 PCP Alexa Rey MD       Anesthesia Post-op Note    ESOPHAG

## 2021-05-27 NOTE — PROGRESS NOTES
[facility] Cardiology  Progress Note    Darcy Ordoñez Patient Status:  Inpatient    10/10/1945 MRN [id number]   [facility] 6NE-A Attending Margaret Williamson MD   Hosp Day # 2 PCP Yisel Rios MD     Impression:  1.  Elevated T no acute distress  Cardiac: Regular rate and regular rhythm; no murmurs/rubs/gallops are appreciated  Lungs: Clear to auscultation bilaterally; no accessory muscle use  Abdomen: Soft, non-tender; bowel sounds are normoactive  Extremities: No clubbing/cyano

## 2021-05-28 ENCOUNTER — ANESTHESIA EVENT (OUTPATIENT)
Dept: SURGERY | Facility: HOSPITAL | Age: 76
DRG: 270 | End: 2021-05-28
Payer: MEDICARE

## 2021-05-28 ENCOUNTER — ANESTHESIA (OUTPATIENT)
Dept: ENDOSCOPY | Facility: HOSPITAL | Age: 76
DRG: 270 | End: 2021-05-28
Payer: MEDICARE

## 2021-05-28 DIAGNOSIS — K31.89 GASTRIC MASS: ICD-10-CM

## 2021-05-28 DIAGNOSIS — K92.2 GASTROINTESTINAL HEMORRHAGE, UNSPECIFIED GASTROINTESTINAL HEMORRHAGE TYPE: Primary | ICD-10-CM

## 2021-05-28 PROCEDURE — BD47ZZZ ULTRASONOGRAPHY OF GASTROINTESTINAL TRACT: ICD-10-PCS | Performed by: INTERNAL MEDICINE

## 2021-05-28 PROCEDURE — C9113 INJ PANTOPRAZOLE SODIUM, VIA: HCPCS | Performed by: INTERNAL MEDICINE

## 2021-05-28 PROCEDURE — 84132 ASSAY OF SERUM POTASSIUM: CPT | Performed by: HOSPITALIST

## 2021-05-28 PROCEDURE — 0DJ08ZZ INSPECTION OF UPPER INTESTINAL TRACT, VIA NATURAL OR ARTIFICIAL OPENING ENDOSCOPIC: ICD-10-PCS | Performed by: INTERNAL MEDICINE

## 2021-05-28 RX ORDER — SODIUM CHLORIDE, SODIUM LACTATE, POTASSIUM CHLORIDE, CALCIUM CHLORIDE 600; 310; 30; 20 MG/100ML; MG/100ML; MG/100ML; MG/100ML
INJECTION, SOLUTION INTRAVENOUS CONTINUOUS
Status: DISCONTINUED | OUTPATIENT
Start: 2021-05-28 | End: 2021-05-29

## 2021-05-28 RX ORDER — CHLORHEXIDINE GLUCONATE 4 G/100ML
30 SOLUTION TOPICAL ONCE
Status: COMPLETED | OUTPATIENT
Start: 2021-05-28 | End: 2021-05-28

## 2021-05-28 RX ORDER — ONDANSETRON 2 MG/ML
4 INJECTION INTRAMUSCULAR; INTRAVENOUS AS NEEDED
Status: DISCONTINUED | OUTPATIENT
Start: 2021-05-28 | End: 2021-05-29

## 2021-05-28 RX ORDER — LIDOCAINE HYDROCHLORIDE 10 MG/ML
INJECTION, SOLUTION EPIDURAL; INFILTRATION; INTRACAUDAL; PERINEURAL AS NEEDED
Status: DISCONTINUED | OUTPATIENT
Start: 2021-05-28 | End: 2021-05-28 | Stop reason: SURG

## 2021-05-28 RX ORDER — CHLORHEXIDINE GLUCONATE 4 G/100ML
30 SOLUTION TOPICAL ONCE
Status: COMPLETED | OUTPATIENT
Start: 2021-05-29 | End: 2021-05-29

## 2021-05-28 RX ORDER — PHENYLEPHRINE HCL 10 MG/ML
VIAL (ML) INJECTION AS NEEDED
Status: DISCONTINUED | OUTPATIENT
Start: 2021-05-28 | End: 2021-05-28 | Stop reason: SURG

## 2021-05-28 RX ORDER — NALOXONE HYDROCHLORIDE 0.4 MG/ML
80 INJECTION, SOLUTION INTRAMUSCULAR; INTRAVENOUS; SUBCUTANEOUS AS NEEDED
Status: DISCONTINUED | OUTPATIENT
Start: 2021-05-28 | End: 2021-05-29

## 2021-05-28 RX ADMIN — LIDOCAINE HYDROCHLORIDE 30 MG: 10 INJECTION, SOLUTION EPIDURAL; INFILTRATION; INTRACAUDAL; PERINEURAL at 12:44:00

## 2021-05-28 RX ADMIN — SODIUM CHLORIDE: 9 INJECTION, SOLUTION INTRAVENOUS at 13:27:00

## 2021-05-28 RX ADMIN — PHENYLEPHRINE HCL 100 MCG: 10 MG/ML VIAL (ML) INJECTION at 13:12:00

## 2021-05-28 RX ADMIN — PHENYLEPHRINE HCL 100 MCG: 10 MG/ML VIAL (ML) INJECTION at 12:56:00

## 2021-05-28 RX ADMIN — SODIUM CHLORIDE: 9 INJECTION, SOLUTION INTRAVENOUS at 12:41:00

## 2021-05-28 NOTE — PROGRESS NOTES
[facility]    Progress Note    Eileen Lin Patient Status:  Inpatient    10/10/1945 MRN [id number]   Location [id number] [place] [address] Attending Magdalena Aguayo MD   [facility] Day # 3 PCP Justin Verdugo MD     Subjective:  Eileen Lin is a report to reflect my opinion:  In addition, I discussed with patient face-to-face and subsequently with son Bang Villarreal over the phone options and surgical treatment matter including increased risks. They understand that perioperative mortality may occur.   Card

## 2021-05-28 NOTE — OCCUPATIONAL THERAPY NOTE
Received order for OT evaluation. EGD scheduled today. OT will initiate evaluation once the patient is medically stable.

## 2021-05-28 NOTE — ANESTHESIA PREPROCEDURE EVALUATION
PRE-OP EVALUATION    Patient Name: Graham Painter    Admit Diagnosis: Elevated troponin [R77.8]  Gastrointestinal hemorrhage, unspecified gastrointestinal hemorrhage type [K92.2]  Anemia, unspecified type [D64.9]  Dyspnea, unspecified type [R06.00]    Pre- Evaluation    Patient summary reviewed.     Anesthetic Complications  (-) history of anesthetic complications         GI/Hepatic/Renal  Comment: GI bleeding                               Cardiovascular  Comment: Carotid stenosis    Cruzito Glasgow MD  [facility] Anemia     Gastrointestinal hemorrhage     Gastrointestinal hemorrhage, unspecified gastrointestinal hemorrhage type     Anemia, unspecified type     Elevated troponin          Past Surgical History:   Procedure Laterality Date   • COLONOSCOPY N/A 5/27/202

## 2021-05-28 NOTE — PROGRESS NOTES
[facility]  Progress Note    Roro Spann Patient Status:  Inpatient    10/10/1945 MRN [id number]   Location [address] [address] Attending Obdulia Riley MD   [facility] Day # 3 PCP Freddie Ma MD     Subjective:  Roro Spann is a(n --   --    RDW 21.7*  --  22.2*  --  21.2*  --   --    NEPRELIM 6.52  --  11.05*  --  9.58*  --   --    WBC 8.1  --  12.9*  --  11.3*  --   --    .0  --  338.0  --  244.0  --   --     < > = values in this interval not displayed.        Lab Results

## 2021-05-28 NOTE — DIETARY NOTE
[facility]    NUTRITION ASSESSMENT    Pt does not meet malnutrition criteria. NUTRITION INTERVENTION:    1. RD nutrition Care Plan- See RD nutrition assessment for additional recommendations   2. Meal and Snacks - monitor patient po intake.  Corina Patel (171 lb)  05/10/19 : 77.3 kg (170 lb 6.4 oz)  03/14/19 : 75.3 kg (166 lb)  12/12/18 : 75.8 kg (167 lb)  09/14/18 : 78.5 kg (173 lb)  06/13/18 : 79.4 kg (175 lb)  03/02/18 : 80.3 kg (177 lb)       NUTRITION:  Diet: Orders Placed This Encounter      Cardiac

## 2021-05-28 NOTE — PROGRESS NOTES
[facility] Hospitalist note    PCP: Hayden Wilkes MD    Chief Complaint:  LH, melena/anemia, stemi    SUBJECTIVE:  Went for egd with eus this morning. Sleepy but responds.  Denies chest pain    OBJECTIVE:  Temp:  [98.8 °F (37.1 °C)-99.3 °F (37.4 °C)] 99.3 °F (37 Recent Labs   Lab 05/25/21  0914 05/27/21  0948   ALT 19 46   AST 22 209*   ALB 3.6 2.6*       No results for input(s): PGLU in the last 168 hours.     Recent Labs   Lab 05/25/21  0914 05/25/21  1820 05/26/21  0452   TROP 1.070* 10.300* 39.800*

## 2021-05-28 NOTE — PROGRESS NOTES
[facility] Cardiology  Progress Note    Alexis Jin Patient Status:  Inpatient    10/10/1945 MRN [id number]   [facility] 6NE-A Attending Thierry Lovell MD   Hosp Day # 3 PCP Jame Gilliam MD     Impression:  1.  Elevated T Wt Readings from Last 3 Encounters:  05/28/21 : 166 lb 10.7 oz (75.6 kg)  01/13/20 : 163 lb (73.9 kg)  11/27/19 : 165 lb 8 oz (75.1 kg)      General: Awake and alert; in no acute distress  Cardiac: Regular rate and regular rhythm; no murmurs/rubs/ty

## 2021-05-28 NOTE — H&P
History & Physical Examination    Patient Name: Rachlele Warren  MRN: [id number]  [facility]: [id number]  YOB: 1945    Diagnosis: Elevated troponin [R77.8]  Gastrointestinal hemorrhage, unspecified gastrointestinal hemorrhage type [K92.2]  Anemia, un PRN  Metoclopramide HCl (REGLAN) injection 10 mg, 10 mg, Intravenous, Q8H PRN  Pantoprazole Sodium (PROTONIX) 40 mg in Sodium Chloride (PF) 0.9 % 10 mL IV push, 40 mg, Intravenous, Q12H  norepinephrine (LEVOPHED) 4 mg/250 ml premix infusion, 0.5-30 mcg/min

## 2021-05-28 NOTE — PLAN OF CARE
Patient VSS. No major complaints of abdominal pain or other discomforts. No SOB. Patient  NPO with plans of Esoph ultrasound. Plan of care updated with son and patient.

## 2021-05-28 NOTE — PLAN OF CARE
Assumed care of pt @ 0730. Pt NPO since midnight for EGD. Pt weaned off of levophed right before procedure, placed back on it by anesthesia after procedure. Levo weaned back off. Pt tolerating well. Up to chair to eat dinner. Pt son at bedside.  Surgical o

## 2021-05-28 NOTE — ANESTHESIA POSTPROCEDURE EVALUATION
[facility] Patient Status:  Inpatient   Age/Gender 66year old male MRN [id number]   [facility] 6NE-A Attending Lachelle Kemp MD   Hosp Day # 3 PCP Jame Gilliam MD       Anesthesia Post-op Note    [facility]

## 2021-05-28 NOTE — PHYSICAL THERAPY NOTE
Physical Therapy    Orders for PT eval received. Pt scheduled for endoscopy around noon today. Will hold eval today and re-attempt tomorrow as pt's medical stability allow.

## 2021-05-28 NOTE — OPERATIVE REPORT
OPERATIVE REPORT   PATIENT NAME: Nicole Morales  MRN: [id number]  DATE OF OPERATION: 5/28/2021  PREOPERATIVE DIAGNOSIS: GI bleeding, gastric body mass  POSTOPERATIVE DIAGNOSIS:    1.  Large centrally ulcerated mass   2.   Proximal esophageal stricture likely revealed normal fundus and cardia. There were no immediate complications. FINDINGS   1.  Hypoechoic mass in body of stomach with large central ulceration c/w recent GI bleeding  RECOMMENDATIONS: discussed case with Dr. Salbador Bennett; large caliber GI bleeding fr

## 2021-05-29 ENCOUNTER — APPOINTMENT (OUTPATIENT)
Dept: GENERAL RADIOLOGY | Facility: HOSPITAL | Age: 76
DRG: 270 | End: 2021-05-29
Attending: SURGERY
Payer: MEDICARE

## 2021-05-29 ENCOUNTER — ANESTHESIA (OUTPATIENT)
Dept: SURGERY | Facility: HOSPITAL | Age: 76
DRG: 270 | End: 2021-05-29
Payer: MEDICARE

## 2021-05-29 PROBLEM — K31.89 MASS OF STOMACH: Status: ACTIVE | Noted: 2021-05-29

## 2021-05-29 PROCEDURE — 76942 ECHO GUIDE FOR BIOPSY: CPT | Performed by: ANESTHESIOLOGY

## 2021-05-29 PROCEDURE — 86920 COMPATIBILITY TEST SPIN: CPT

## 2021-05-29 PROCEDURE — 83735 ASSAY OF MAGNESIUM: CPT | Performed by: INTERNAL MEDICINE

## 2021-05-29 PROCEDURE — 36430 TRANSFUSION BLD/BLD COMPNT: CPT | Performed by: ANESTHESIOLOGY

## 2021-05-29 PROCEDURE — 88307 TISSUE EXAM BY PATHOLOGIST: CPT | Performed by: SURGERY

## 2021-05-29 PROCEDURE — 0DU607Z SUPPLEMENT STOMACH WITH AUTOLOGOUS TISSUE SUBSTITUTE, OPEN APPROACH: ICD-10-PCS | Performed by: SURGERY

## 2021-05-29 PROCEDURE — 86850 RBC ANTIBODY SCREEN: CPT | Performed by: INTERNAL MEDICINE

## 2021-05-29 PROCEDURE — 85027 COMPLETE CBC AUTOMATED: CPT | Performed by: INTERNAL MEDICINE

## 2021-05-29 PROCEDURE — 88381 MICRODISSECTION MANUAL: CPT | Performed by: SURGERY

## 2021-05-29 PROCEDURE — 88342 IMHCHEM/IMCYTCHM 1ST ANTB: CPT | Performed by: SURGERY

## 2021-05-29 PROCEDURE — 86900 BLOOD TYPING SEROLOGIC ABO: CPT | Performed by: INTERNAL MEDICINE

## 2021-05-29 PROCEDURE — 84295 ASSAY OF SERUM SODIUM: CPT

## 2021-05-29 PROCEDURE — 71045 X-RAY EXAM CHEST 1 VIEW: CPT | Performed by: SURGERY

## 2021-05-29 PROCEDURE — C9113 INJ PANTOPRAZOLE SODIUM, VIA: HCPCS | Performed by: INTERNAL MEDICINE

## 2021-05-29 PROCEDURE — 0DB60ZZ EXCISION OF STOMACH, OPEN APPROACH: ICD-10-PCS | Performed by: SURGERY

## 2021-05-29 PROCEDURE — 82330 ASSAY OF CALCIUM: CPT

## 2021-05-29 PROCEDURE — 94640 AIRWAY INHALATION TREATMENT: CPT

## 2021-05-29 PROCEDURE — 84100 ASSAY OF PHOSPHORUS: CPT | Performed by: INTERNAL MEDICINE

## 2021-05-29 PROCEDURE — 85014 HEMATOCRIT: CPT

## 2021-05-29 PROCEDURE — 80053 COMPREHEN METABOLIC PANEL: CPT | Performed by: INTERNAL MEDICINE

## 2021-05-29 PROCEDURE — 84132 ASSAY OF SERUM POTASSIUM: CPT

## 2021-05-29 PROCEDURE — 88341 IMHCHEM/IMCYTCHM EA ADD ANTB: CPT | Performed by: SURGERY

## 2021-05-29 PROCEDURE — 81314 PDGFRA GENE: CPT | Performed by: SURGERY

## 2021-05-29 PROCEDURE — 85027 COMPLETE CBC AUTOMATED: CPT | Performed by: SURGERY

## 2021-05-29 PROCEDURE — 82803 BLOOD GASES ANY COMBINATION: CPT

## 2021-05-29 PROCEDURE — 81272 KIT GENE TARGETED SEQ ANALYS: CPT | Performed by: SURGERY

## 2021-05-29 PROCEDURE — 86901 BLOOD TYPING SEROLOGIC RH(D): CPT | Performed by: INTERNAL MEDICINE

## 2021-05-29 RX ORDER — FUROSEMIDE 10 MG/ML
40 INJECTION INTRAMUSCULAR; INTRAVENOUS
Status: COMPLETED | OUTPATIENT
Start: 2021-05-29 | End: 2021-05-29

## 2021-05-29 RX ORDER — HYDROMORPHONE HYDROCHLORIDE 1 MG/ML
0.3 INJECTION, SOLUTION INTRAMUSCULAR; INTRAVENOUS; SUBCUTANEOUS ONCE
Status: COMPLETED | OUTPATIENT
Start: 2021-05-29 | End: 2021-05-29

## 2021-05-29 RX ORDER — METOPROLOL TARTRATE 5 MG/5ML
5 INJECTION INTRAVENOUS EVERY 6 HOURS SCHEDULED
Status: DISCONTINUED | OUTPATIENT
Start: 2021-05-29 | End: 2021-06-01

## 2021-05-29 RX ORDER — CEFOXITIN 2 G/1
INJECTION, POWDER, FOR SOLUTION INTRAVENOUS AS NEEDED
Status: DISCONTINUED | OUTPATIENT
Start: 2021-05-29 | End: 2021-05-29 | Stop reason: SURG

## 2021-05-29 RX ORDER — IPRATROPIUM BROMIDE AND ALBUTEROL SULFATE 2.5; .5 MG/3ML; MG/3ML
3 SOLUTION RESPIRATORY (INHALATION) EVERY 6 HOURS PRN
Status: DISCONTINUED | OUTPATIENT
Start: 2021-05-29 | End: 2021-06-04

## 2021-05-29 RX ORDER — BUPIVACAINE HYDROCHLORIDE 2.5 MG/ML
INJECTION, SOLUTION EPIDURAL; INFILTRATION; INTRACAUDAL AS NEEDED
Status: DISCONTINUED | OUTPATIENT
Start: 2021-05-29 | End: 2021-05-29 | Stop reason: SURG

## 2021-05-29 RX ORDER — LIDOCAINE HYDROCHLORIDE ANHYDROUS AND DEXTROSE MONOHYDRATE .8; 5 G/100ML; G/100ML
INJECTION, SOLUTION INTRAVENOUS CONTINUOUS PRN
Status: DISCONTINUED | OUTPATIENT
Start: 2021-05-29 | End: 2021-05-29 | Stop reason: SURG

## 2021-05-29 RX ORDER — ACETAMINOPHEN 10 MG/ML
1000 INJECTION, SOLUTION INTRAVENOUS EVERY 6 HOURS PRN
Status: DISCONTINUED | OUTPATIENT
Start: 2021-05-29 | End: 2021-05-29

## 2021-05-29 RX ORDER — ROCURONIUM BROMIDE 10 MG/ML
INJECTION, SOLUTION INTRAVENOUS AS NEEDED
Status: DISCONTINUED | OUTPATIENT
Start: 2021-05-29 | End: 2021-05-29 | Stop reason: SURG

## 2021-05-29 RX ORDER — SODIUM CHLORIDE, SODIUM LACTATE, POTASSIUM CHLORIDE, CALCIUM CHLORIDE 600; 310; 30; 20 MG/100ML; MG/100ML; MG/100ML; MG/100ML
INJECTION, SOLUTION INTRAVENOUS CONTINUOUS
Status: DISCONTINUED | OUTPATIENT
Start: 2021-05-29 | End: 2021-06-01

## 2021-05-29 RX ORDER — SODIUM CHLORIDE, SODIUM LACTATE, POTASSIUM CHLORIDE, CALCIUM CHLORIDE 600; 310; 30; 20 MG/100ML; MG/100ML; MG/100ML; MG/100ML
INJECTION, SOLUTION INTRAVENOUS CONTINUOUS PRN
Status: DISCONTINUED | OUTPATIENT
Start: 2021-05-29 | End: 2021-05-29 | Stop reason: SURG

## 2021-05-29 RX ORDER — ONDANSETRON 2 MG/ML
4 INJECTION INTRAMUSCULAR; INTRAVENOUS EVERY 6 HOURS PRN
Status: DISCONTINUED | OUTPATIENT
Start: 2021-05-29 | End: 2021-06-04

## 2021-05-29 RX ORDER — ACETAMINOPHEN 10 MG/ML
1000 INJECTION, SOLUTION INTRAVENOUS EVERY 6 HOURS
Status: DISCONTINUED | OUTPATIENT
Start: 2021-05-29 | End: 2021-05-31

## 2021-05-29 RX ADMIN — ROCURONIUM BROMIDE 50 MG: 10 INJECTION, SOLUTION INTRAVENOUS at 09:54:00

## 2021-05-29 RX ADMIN — BUPIVACAINE HYDROCHLORIDE 60 ML: 2.5 INJECTION, SOLUTION EPIDURAL; INFILTRATION; INTRACAUDAL at 10:40:00

## 2021-05-29 RX ADMIN — SODIUM CHLORIDE, SODIUM LACTATE, POTASSIUM CHLORIDE, CALCIUM CHLORIDE: 600; 310; 30; 20 INJECTION, SOLUTION INTRAVENOUS at 09:43:00

## 2021-05-29 RX ADMIN — CEFOXITIN 2 G: 2 INJECTION, POWDER, FOR SOLUTION INTRAVENOUS at 10:02:00

## 2021-05-29 RX ADMIN — LIDOCAINE HYDROCHLORIDE ANHYDROUS AND DEXTROSE MONOHYDRATE 2 MG/KG/HR: .8; 5 INJECTION, SOLUTION INTRAVENOUS at 09:51:00

## 2021-05-29 NOTE — OCCUPATIONAL THERAPY NOTE
OT orders received via functional mobility screen. Pt with plan for emergent surgery today, not appropriate for skilled therapy. Discussed with RN. Therapy to check back 5/30 or as appropriate.

## 2021-05-29 NOTE — ANESTHESIA PROCEDURE NOTES
Arterial Line  Performed by: Choco Mccoy MD  Authorized by: Choco Mccoy MD     General Information and Staff    Procedure Start:   Anesthesiologist: Choco Mccoy MD  Performed By:  Anesthesiologist  Patient Location:  OR  Indication: continuous bloo

## 2021-05-29 NOTE — PROGRESS NOTES
Surgery input appreciated.     Will sign off for now, defer post op management to surg team    Illa Schwab, MD  [address] [place] [address] Gastroenterology

## 2021-05-29 NOTE — PROGRESS NOTES
Assumed care of pt. At [address]. Pt. Resting in bed. A & O x4. VSS. ARGUELLES. Denies of any pain at this time. NPO at midnight. Hibiclens done x2, EKG leads changed. Questions and concerns addressed w/ patient. Will continue to monitor closely.

## 2021-05-29 NOTE — ANESTHESIA PROCEDURE NOTES
[facility]  Performed by: Maryam Mitchell MD  Authorized by: Maryam Mitchell MD       General Information and Staff    Start Time:  5/29/2021 10:38 AM  End Time:  5/29/2021 10:40 AM  Anesthesiologist:  Maryam Mitchell MD  Performed by:   Anesthesiologist  Abner Ly

## 2021-05-29 NOTE — ANESTHESIA PROCEDURE NOTES
Airway  Date/Time: 5/29/2021 9:56 AM  Urgency: elective    Airway not difficult    General Information and Staff    Patient location during procedure: OR  Anesthesiologist: Silvina Pratt MD  Performed: anesthesiologist     Indications and Patient Condition

## 2021-05-29 NOTE — PHYSICAL THERAPY NOTE
Physical Therapy    Re-attempted PT eval today, but pt planned for emergent gastric tumor resection. Will re-attempt post-op and as pt's medical status allows.

## 2021-05-29 NOTE — PROGRESS NOTES
[facility] Hospitalist note    PCP: Alexis Pritchett MD    Chief Complaint:  LH, melena/anemia, stemi    SUBJECTIVE: s/p OR states he is having pain. Awaiting PCA pump. Denies SOB.  No nausea    OBJECTIVE:  Temp:  [97.3 °F (36.3 °C)-99.1 °F (37.3 °C)] 97.3 °F (36.3 2.4*   * 126* 116*  --  92       Recent Labs   Lab 05/25/21  0914 05/27/21  0948 05/29/21  0515   ALT 19 46 27   AST 22 209* 48*   ALB 3.6 2.6* 2.2*       No results for input(s): PGLU in the last 168 hours.     Recent Labs   Lab 05/25/21  0914 05/25 encourage IS     Prophy:  DVT: SCDs, no subQ heparin  Deconditioning prevention: PT/OT    DISPO: CCU     Aga Morales DO  [facility] Hospitalist  [phone]

## 2021-05-29 NOTE — ANESTHESIA POSTPROCEDURE EVALUATION
[facility] Patient Status:  Inpatient   Age/Gender 66year old male MRN [id number]   [facility] 6NE-A Attending Amy Campo, [address] [facility] Day # 4 PCP John Tierney MD       Anesthesia Post-op Note    gastrotomy, wedge r

## 2021-05-29 NOTE — PROGRESS NOTES
[facility]  Progress Note    Dewey Chase Patient Status:  Inpatient    10/10/1945 MRN [id number]   [facility] [place] 6NE-A Attending Monse Martines MD   Hosp Day # 4 PCP Ana Macdonald MD       Subjective:  Underwent a Continuous        Laboratory Data:  Lab Results   Component Value Date    WBC 7.0 05/29/2021    HGB 7.5 05/29/2021    HCT 25.1 05/29/2021    .0 05/29/2021     Lab Results   Component Value Date    INR 1.21 (H) 05/25/2021    INR 1.38 (H) 11/27/2019

## 2021-05-29 NOTE — PLAN OF CARE
Assumed patient care this afternoon around 1700. Patient is alert and oriented. Patient is on 3 L nasal cannula, lung sounds diminished. Patients vital signs are WNL, pedal pulses are palpable. Patients NG to LIS, tolerating ice chips, incision is C/D/I.  P

## 2021-05-29 NOTE — PLAN OF CARE
Contacted by Dr. Neeta James regarding planned surgery tomorrow. The case was also discussed with my partner Dr. Sultana Chavira. This is a high cardiac risk patient who is scheduled to have an emergent intermediate-high cardiac risk procedure.  As this is an emergent pr

## 2021-05-29 NOTE — BRIEF OP NOTE
Pre-Operative Diagnosis: Gastrointestinal hemorrhage, unspecified gastrointestinal hemorrhage type [K92.2]  Gastric mass [K31.89]     Post-Operative Diagnosis: Gastrointestinal hemorrhage, unspecified gastrointestinal hemorrhage type [K92. 2]Gastric mass [K

## 2021-05-30 PROCEDURE — 97530 THERAPEUTIC ACTIVITIES: CPT

## 2021-05-30 PROCEDURE — 92610 EVALUATE SWALLOWING FUNCTION: CPT

## 2021-05-30 PROCEDURE — 84100 ASSAY OF PHOSPHORUS: CPT | Performed by: INTERNAL MEDICINE

## 2021-05-30 PROCEDURE — 97165 OT EVAL LOW COMPLEX 30 MIN: CPT

## 2021-05-30 PROCEDURE — 83735 ASSAY OF MAGNESIUM: CPT | Performed by: INTERNAL MEDICINE

## 2021-05-30 PROCEDURE — 85027 COMPLETE CBC AUTOMATED: CPT | Performed by: INTERNAL MEDICINE

## 2021-05-30 PROCEDURE — C9113 INJ PANTOPRAZOLE SODIUM, VIA: HCPCS | Performed by: INTERNAL MEDICINE

## 2021-05-30 PROCEDURE — 97162 PT EVAL MOD COMPLEX 30 MIN: CPT

## 2021-05-30 PROCEDURE — 80053 COMPREHEN METABOLIC PANEL: CPT | Performed by: INTERNAL MEDICINE

## 2021-05-30 RX ORDER — FUROSEMIDE 10 MG/ML
20 INJECTION INTRAMUSCULAR; INTRAVENOUS DAILY
Status: DISCONTINUED | OUTPATIENT
Start: 2021-05-30 | End: 2021-06-01

## 2021-05-30 RX ORDER — ENOXAPARIN SODIUM 100 MG/ML
40 INJECTION SUBCUTANEOUS DAILY
Status: DISCONTINUED | OUTPATIENT
Start: 2021-05-30 | End: 2021-06-04

## 2021-05-30 NOTE — PLAN OF CARE
Patient care assumed 1900 in bed with dilaudid PCA and fluids. Abdominal incision with scant drainagem woodard in place with good urine output. NG tube to LIS with minimal output, active bowel sounds. Edema in distal extremities.  Pain tolerable but rated hig

## 2021-05-30 NOTE — PROGRESS NOTES
POD#1 s/p wedge resection gastric tumor    Feels well  Pain controlled    Up in chair    Blood pressure 91/65, pulse 80, temperature 97.9 °F (36.6 °C), temperature source Temporal, resp.  rate 25, height 1.651 m (5' 5\"), weight 77.3 kg (170 lb 6.7 oz), SpO

## 2021-05-30 NOTE — PROGRESS NOTES
[address] [facility] Cardiology  Progress Note    Aristeo Terrazas Patient Status:  Inpatient    10/10/1945 MRN [id number]   [facility] 6NE-A Attending Archana Booth MD   Hosp Day # 5 PCP Meena Arita MD       Subjective:  Underwent a Daily  Pantoprazole Sodium (PROTONIX) 40 mg in Sodium Chloride (PF) 0.9 % 10 mL IV push, 40 mg, Intravenous, Q12H  norepinephrine (LEVOPHED) 4 mg/250 ml premix infusion, 0.5-30 mcg/min, Intravenous, Continuous        Laboratory Data:  Lab Results   Compone

## 2021-05-30 NOTE — PHYSICAL THERAPY NOTE
PHYSICAL THERAPY EVALUATION - INPATIENT     Room Number: 2628/7292-E  Evaluation Date: 5/30/2021  Type of Evaluation: Initial  Physician Order: PT Eval and Treat    Presenting Problem: STEMI, gastric mass s/p resection 5/29/21  Reason for Therapy:  Matt Wells video swallow study   • OTHER SURGICAL HISTORY  06/2012       HOME SITUATION  Type of Home: House   Home Layout: Two level  Stairs to Enter : 2     Stairs to [employer]: 12 (chair lift present)  Railing: Yes    Lives With: Spouse; Family  Drives: No  Patien bedclothes, sheets and blankets)?: A Lot   -   Sitting down on and standing up from a chair with arms (e.g., wheelchair, bedside commode, etc.): A Little   -   Moving from lying on back to sitting on the side of the bed?: A Little   How much help from anot addressed; Alarm set    ASSESSMENT   Patient is a 66year old male admitted on 5/25/2021 with LE weakness and dyspnea. Pt presenting with anemia and STEMI. Pt s/p LHC 5/25/21 with occluded LAD and multi vessel CAD.  Pt s/p EGD/colonoscpy 5/27/21 with submuco with assist device: walker - rolling at assistance level: supervision     Goal #4    Goal #5    Goal #6    Goal Comments: Goals established on 5/30/2021

## 2021-05-30 NOTE — PROGRESS NOTES
[facility] Hospitalist note    PCP: Sukhi Patten MD    Chief Complaint:  LH, melena/anemia, stemi    SUBJECTIVE:  Was up in chair today. Doing better. Pain controlled.  No SOB or n/v.     OBJECTIVE:  Temp:  [97.9 °F (36.6 °C)-98.8 °F (37.1 °C)] 97.9 °F (36.6 °C CREATSERUM 1.00  --  1.09 1.07  --  0.96 0.98   CA 8.6  --  8.4* 7.9*  --  7.8* 7.1*   MG  --   --   --   --   --  2.3 2.0   PHOS  --   --   --   --   --  2.4* 3.6   *  --  126* 116*  --  92 101*    < > = values in this interval not displayed. due to above  - transfuse, monitor Hgb  - will need PT/OT eval pending clinical course     # hx of  Head an neck cancer  - hx of prior  - outpatient f/u     # Hx DVT  - not on anticoagulation as outpatient    # small b/l pleural effusions and dependent ate

## 2021-05-30 NOTE — PROGRESS NOTES
ADULT SWALLOWING EVALUATION    ASSESSMENT    ASSESSMENT/OVERALL IMPRESSION:    The patient is a 68-year old male. . Admitted to [facility] 2/2 c/o bilateral leg weakness, shortness of breath and chest burning.  Current dx: Elevated Troponin, [facility] GI medical management  2. Maintain swallowing guidelines  3. Further diagnostic treatment for potential diet upgrade per GI clearance  4.  Participate in VFSS to instrumentally assess swallowing physiology and to further guide plan of care once cleared from VFSS 12/12/2019: Mechanical Soft/ Honey thickened liquids via teaspoon  Imaging Results: CXR 5/29/21: The findings are concerning for pulmonary edema possibly   related to CHF or volume overload. Larwance Golds is no large effusion. Larwance Golds is no pneumothorax. Plan/Recommendations: Dysphagia therapy  Number of Visits to Meet Established Goals: 3     SLP Follow-up Date: 05/31/21    Thank you for your referral.   If you have any questions, please contact Morgan Pastrana

## 2021-05-30 NOTE — OCCUPATIONAL THERAPY NOTE
OCCUPATIONAL THERAPY EVALUATION - INPATIENT     Room Number: 3017/5383-S  Evaluation Date: 5/30/2021  Type of Evaluation: Initial  Presenting Problem: MI, GI bleed, s/p gastrotomy, wedge resection of stomach 5/29/21    Physician Order: Magui Sykes Consult to Ileana video swallow study   • OTHER SURGICAL HISTORY  06/2012       OCCUPATIONAL PROFILE    HOME SITUATION  Type of Home: House  Home Layout: Two level (stair lift, primarily for wife but patient can use)  Lives With: Spouse (two sons)    Toilet and Equipment: S clothing?: A Little  -   Taking care of personal grooming such as brushing teeth?: None  -   Eating meals?: None    AM-PAC Score:  Score: 19  Approx Degree of Impairment: 42.8%  Standardized Score (AM-PAC Scale): 40.22  CMS Modifier (G-Code): JONI FARMER participate in lower body dressing/bathing, toileting, toilet transfers, bed mobility, functional mobility, instrumental activities of daily living, rest and sleep, leisure and social participation.      The patient is functioning below his previous functio

## 2021-05-30 NOTE — PLAN OF CARE
Assumed care of patient at 0730. Patient A/Ox4. Breath sounds diminished, weaned to RA. IS encouraged. VSS, NSR on the monitor. A-line removed. Lasix decreased to 20mg. NG removed by Dr. Fernanda Mercado. Ok for clear liquids.  Patient began coughing immediately after of antiarrhythmic and heart rate control medications as ordered  - Initiate emergency measures for life threatening arrhythmias  - Monitor electrolytes and administer replacement therapy as ordered  Outcome: Progressing

## 2021-05-31 PROCEDURE — 83735 ASSAY OF MAGNESIUM: CPT | Performed by: INTERNAL MEDICINE

## 2021-05-31 PROCEDURE — 83735 ASSAY OF MAGNESIUM: CPT | Performed by: SURGERY

## 2021-05-31 PROCEDURE — 85027 COMPLETE CBC AUTOMATED: CPT | Performed by: SURGERY

## 2021-05-31 PROCEDURE — 92526 ORAL FUNCTION THERAPY: CPT

## 2021-05-31 PROCEDURE — 84100 ASSAY OF PHOSPHORUS: CPT | Performed by: SURGERY

## 2021-05-31 PROCEDURE — 85025 COMPLETE CBC W/AUTO DIFF WBC: CPT | Performed by: SURGERY

## 2021-05-31 PROCEDURE — C9113 INJ PANTOPRAZOLE SODIUM, VIA: HCPCS | Performed by: INTERNAL MEDICINE

## 2021-05-31 PROCEDURE — 84132 ASSAY OF SERUM POTASSIUM: CPT | Performed by: INTERNAL MEDICINE

## 2021-05-31 PROCEDURE — 80048 BASIC METABOLIC PNL TOTAL CA: CPT | Performed by: SURGERY

## 2021-05-31 RX ORDER — ASPIRIN 81 MG/1
81 TABLET, CHEWABLE ORAL DAILY
Status: DISCONTINUED | OUTPATIENT
Start: 2021-05-31 | End: 2021-06-04

## 2021-05-31 RX ORDER — TRAMADOL HYDROCHLORIDE 50 MG/1
50 TABLET ORAL EVERY 6 HOURS PRN
Status: DISCONTINUED | OUTPATIENT
Start: 2021-05-31 | End: 2021-06-04

## 2021-05-31 RX ORDER — ACETAMINOPHEN 500 MG
1000 TABLET ORAL EVERY 6 HOURS
Status: DISCONTINUED | OUTPATIENT
Start: 2021-05-31 | End: 2021-06-04

## 2021-05-31 NOTE — PROGRESS NOTES
[facility] Cardiology  Progress Note    Savanah Maher Patient Status:  Inpatient    10/10/1945 MRN [id number]   [facility] 6NE-A Attending Devorah Costa MD   Hosp Day # 6 PCP Sammi Hodgkins, MD       Subjective:  Underwent a (COREG) tab 3.125 mg, 3.125 mg, Oral, BID with meals  lisinopril tab 2.5 mg, 2.5 mg, Oral, Daily  Pantoprazole Sodium (PROTONIX) 40 mg in Sodium Chloride (PF) 0.9 % 10 mL IV push, 40 mg, Intravenous, Q12H        Laboratory Data:  Lab Results   Component Va

## 2021-05-31 NOTE — PROGRESS NOTES
[place] Hospitalist note    PCP: Colin Knox MD    Chief Complaint:  LH, melena/anemia, stemi    SUBJECTIVE: pain controlled. Breathing ok. Coughing at times.  No n/v    OBJECTIVE:  Temp:  [97.8 °F (36.6 °C)-98.8 °F (37.1 °C)] 98.3 °F (36.8 °C)  Pulse:  [99 7.9*   MG  --   --   --   --  2.3 2.0 2.1   PHOS  --   --   --   --  2.4* 3.6 2.6   *  --  116*  --  92 101* 83    < > = values in this interval not displayed.        Recent Labs   Lab 05/25/21  0914 05/27/21  0948 05/29/21  0515 05/30/21  0417   ALT pending clinical course     # hx of  Head an neck cancer  - hx of prior  - outpatient f/u     # Hx DVT  - not on anticoagulation as outpatient    # small b/l pleural effusions and dependent atelectasis  - seen on CT imaging  - pt on o2 prn  - encourage IS

## 2021-05-31 NOTE — PLAN OF CARE
Assumed care 1900  Patient drowsy, oriented x4  Trejo draining urine  Pain controlled on dilaudid PCA  Bowel sounds active  2 L nasal cannula applied while sleeping  Urine borderline but adequate per surgical oncology protocol  + gas no BM

## 2021-05-31 NOTE — PLAN OF CARE
Received pt from CCU around 1600  Abd incision with surgical drsg intact  BUE nonpitting edema  Tolerating small amts of clear liquids with no nausea  Trejo intact. Draining clear, yellow urine  Pain controlled with PCA and tylenol  Son at bedside.  Updated

## 2021-05-31 NOTE — PLAN OF CARE
Patient awake, alert and oriented x4, hard of hearing  Telemetry, Sinus Rhythm   Episode of 11 b NSVT and accelerated idioventricular rhythm; patient asymptomatic; Dr. Brien Gallego notifeid; recheck k and mag  Patient c/o incisional abdominal discomfort 2- signs, rhythm, and trends  - Monitor for bleeding, hypotension and signs of decreased cardiac output  - Evaluate effectiveness of vasoactive medications to optimize hemodynamic stability  - Monitor arterial and/or venous puncture sites for bleeding and/or Scott Harrison RN  Outcome: Progressing  5/31/2021 1426 by Scott Harrison RN  Outcome: Progressing  Goal: Hemodynamic stability and optimal renal function maintained  Description: INTERVENTIONS:  - Monitor labs and assess for signs and symptoms

## 2021-05-31 NOTE — PROGRESS NOTES
POD#2 s/p wedge resection gastric tumor    No acute events. Stable and afebrile. Pain under control. Blood pressure 112/73, pulse 82, temperature 98.3 °F (36.8 °C), temperature source Oral, resp.  rate 20, height 1.651 m (5' 5\"), weight 78.9 kg (173

## 2021-06-01 PROCEDURE — 92526 ORAL FUNCTION THERAPY: CPT

## 2021-06-01 PROCEDURE — 80048 BASIC METABOLIC PNL TOTAL CA: CPT | Performed by: SURGERY

## 2021-06-01 PROCEDURE — 81003 URINALYSIS AUTO W/O SCOPE: CPT | Performed by: PHYSICIAN ASSISTANT

## 2021-06-01 PROCEDURE — 85025 COMPLETE CBC W/AUTO DIFF WBC: CPT | Performed by: SURGERY

## 2021-06-01 PROCEDURE — C9113 INJ PANTOPRAZOLE SODIUM, VIA: HCPCS | Performed by: INTERNAL MEDICINE

## 2021-06-01 RX ORDER — METOPROLOL SUCCINATE 25 MG/1
25 TABLET, EXTENDED RELEASE ORAL
Status: DISCONTINUED | OUTPATIENT
Start: 2021-06-01 | End: 2021-06-04

## 2021-06-01 RX ORDER — TAMSULOSIN HYDROCHLORIDE 0.4 MG/1
0.4 CAPSULE ORAL DAILY
Status: DISCONTINUED | OUTPATIENT
Start: 2021-06-01 | End: 2021-06-04

## 2021-06-01 NOTE — PLAN OF CARE
Assumed care at [address]. POD#3. AOx4 Huslia. NSR on tele monitor. Unable to void; Bladder scan received 126; Straight cath 300cc at midnight. Midline dressing C/D/I. Tolerated diet well. Plan of care discussed with pt. Call light within reach.

## 2021-06-01 NOTE — PROGRESS NOTES
[facility] Hospitalist Progress Note     [facility]      SUBJECTIVE:  No CP, SOB, or N/V.   Having some pain in left arm    OBJECTIVE:  Temp:  [98.2 °F (36.8 °C)-99 °F (37.2 °C)] 98.2 °F (36.8 °C)  Pulse:  [73-85] 80  Resp:  [15-21] 19  BP: ()/(63-85) CHEST AP/PA (1 VIEW) (CPT=71045)  TECHNIQUE:  AP chest radiograph was obtained.   COMPARISON:  [facility] , XR, XR CHEST AP PORTABLE  (CPT=71045), 5/25/2021, 9:03 AM.  INDICATIONS:  s/p attempted central line placement  PATIENT STATED HISTORY: (As transcribed b transcribed by Technologist)  Bilateral legs weakness, trouble with balance and shortness of breath with burning in chest.    FINDINGS:  VENTRICLES/SULCI:  Ventricles and sulci are prominent compatible with atrophy.  INTRACRANIAL:  There is ectasia of the b CONTRAST USED:  100cc of Omnipaque 350  FINDINGS:  LIVER:  No enlargement, atrophy, abnormal density, or significant focal lesion. BILIARY:  High-density material in gallbladder could be gallbladder sludge or noncalcified stones.  PANCREAS:  No lesion, flu Diverticulosis of colon without CT evidence of diverticulitis. 4. High density material in dependent part of gallbladder could represent noncalcified stones or sludge.    Dictated by (CST): Maria Alejandra Mcleod MD on 5/27/2021 at 2:18 PM     Finalized by (CST): Aline Robert Nephew Date: 2021 :  10/10/1945 Ht:  (65in)  BP: 110 / 80 MRN:  [id number]    Age:  75years    Wt:  (169lb) HR: 99bpm Loc:  [facility]        Gndr: M          BSA: 1.84m^2 Sonographer: ZACHARIAH Ktae Ordering:    Carla Do Consulting:  Geraldo Mildly calcified annulus. Transvalvular velocity was within    the normal range. There was no evidence for stenosis. There was trivial    regurgitation. 4. Left atrium: The left atrial volume was mildly increased.  5. Pulmonary arteries: Systolic pressure w visualized. Doppler:  Transvalvular velocity was within the normal range. There was no evidence for stenosis. There was no significant regurgitation. Pericardium:  There was no pericardial effusion. Aorta:   Aortic root: The aortic root was normal. Pulmona 15.6  cm          ---------  Stroke volume (SV), LVOT DP                  59    ml          ---------  Cardiac output (Qs), LVOT DP                 6.3   L/min       ---------  Cardiac index (Qs/bsa), LVOT DP              3.4   L/(min-m^2) ------ 58  LA volume/bsa, ES, 1-p A2C                   43    ml/m^2      ---------   Mitral valve                                 Value             Reference  Mitral E-wave peak velocity                  0.96  m/sec       ---------  Mitral A-wave peak velocity information is transmitted to the ACR ([facility]) NRDR ([address]) which includes the Dose Index Registry.   PATIENT STATED HISTORY:(As transcribed by Technologist)   shortness of breath, burning in chest similar t 5/25/2021 at 1:02 PM       NM GI BLOOD LOSS STUDY SPECT/CT (SINGLE) 1 DAY (CPT=78278/45213) Once    Result Date: 5/25/2021  PROCEDURE:  NM GI BLOOD LOSS STUDY SPECT/CT(SINGLE) 1 DAY (CPT=78278/36604)  COMPARISON:  None.   INDICATION:   TECHNIQUE:  28.6 mCi Nightly  lisinopril tab 2.5 mg, 2.5 mg, Oral, Daily  Pantoprazole Sodium (PROTONIX) 40 mg in Sodium Chloride (PF) 0.9 % 10 mL IV push, 40 mg, Intravenous, Q12H       Assessment/Plan:      66year old male with PMH of prior DVT, head and neck cancer, HTN, H

## 2021-06-01 NOTE — CM/SW NOTE
Pt is a 67 yo male admitted for azotemia. Pt lives at home with his wife. Pt has been to [facility] in the past and has also had [facility] in the past.  PT is recommending HHPT. Discussed HH with pt - pt declined HH at this time.   Social work

## 2021-06-01 NOTE — SLP NOTE
SPEECH DAILY NOTE - INPATIENT    ASSESSMENT & PLAN   ASSESSMENT  Pt seen for dysphagia tx to assess tolerance with recommended diet, ensure proper utilization of aspiration precautions and provide pt/family education.   Patient with extensive dysphagia hist a post gastrectomy diet and thin liquids without overt s/s of aspiration with 90% accuracy over 1-2 sessions.   Revised   Goal #2 Patient will participate in VFSS to instrumentally assess swallowing physiology and follow plan of care per GI/surgery clearanc

## 2021-06-01 NOTE — OPERATIVE REPORT
[facility]    PATIENT'S NAME: [facility]   ATTENDING PHYSICIAN: Karen Patrick. DO Jacky   OPERATING PHYSICIAN: Sapna Whitten.  Lolita Fonseca MD   PATIENT ACCOUNT#:   [id number]    LOCATION:  [address] [place] [address]  MEDICAL RECORD #:   [id number]       DATE OF BIRTH: unremarkable by inspection. The specimen was sent to Pathology for permanent section evaluation. Hemostasis was achieved and maintained. A nasogastric tube was confirmed in place.   The longitudinal gastrotomy was then repaired in 2 layers using 3-0 Vicr

## 2021-06-01 NOTE — PLAN OF CARE
Assumed care this am  Pt stable and alert. Trejo re inserted d/t retention. Plenty of output. Urology consulted, flomax started. Upgraded to soft diet and thin liquids. Tolerating well. Does not complain of pain except for when he coughs.    Updated p

## 2021-06-01 NOTE — CM/SW NOTE
Pt is a 67 yo male admitted for azotemia. Pt lives with his wife at home. Pt has been to [facility] in the past and has had [facility] in the past as well. PT is recommending HHPT. Discussed HH with pt and he declined HH at this time.   Social w

## 2021-06-01 NOTE — ANESTHESIA PREPROCEDURE EVALUATION
PRE-OP EVALUATION    Patient Name: Miguel Laboy    Admit Diagnosis: Elevated troponin [R77.8]  Gastrointestinal hemorrhage, unspecified gastrointestinal hemorrhage type [K92.2]  Anemia, unspecified type [D64.9]  Dyspnea, unspecified type [R06.00]    Pre- Once  [COMPLETED] Chlorhexidine Gluconate (HIBICLENS) 4 % liquid 30 mL, 30 mL, Topical, Once  atorvastatin (LIPITOR) tab 40 mg, 40 mg, Oral, Nightly  lisinopril tab 2.5 mg, 2.5 mg, Oral, Daily  [COMPLETED] potassium chloride (KLOR-CON) powder packet 40 mEq List:     HTN (hypertension)     Hearing loss     History of head and neck cancer     DJD (degenerative joint disease), cervical     Osteoarthritis of lumbar spine, unspecified spinal osteoarthritis complication status     History of basal cell cancer II  Mouth opening: >3 FB  TM distance: > 6 cm  Neck ROM: full Cardiovascular      Rhythm: regular  Rate: normal  (-) murmur   Dental  Comment: edentulous    Dental appliance(s): lower dentures and upper dentures       Pulmonary  Comment: Unlabored ventilat

## 2021-06-01 NOTE — PROGRESS NOTES
[facility] Progress Note        [facility] Patient Status:  Inpatient    10/10/1945 MRN [id number]   [facility] 7NE-A Attending Era Purcell, [address] [facility] Day # 7 PCP Mary Chavez MD     Subjective:   [place] Sifuentes murmur. Lungs: Clear to auscultation and percussion. Abdomen: Soft, non-tender. Extremities: No LE edema. No clubbing or cyanosis. Neurologic: Alert and oriented, normal affect. Skin: Warm and dry.            LABS:      HEM:  Recent Labs   Lab 05/2 gastric mass--> resection 5/29/21  3. Head/neck Ca s/p chemo/XRT  4. H/O DVT (neck region), was on Coumadin, but prior to admission on ASA alone, now held. Plan:  1.  Dr. Lares Re discussed angiographic films with Dr. Isa Dickson, who doesn't feel he would benefit

## 2021-06-01 NOTE — CONSULTS
[facility] Urology   Consultation Note    Kali Everett Patient Status:  Inpatient    10/10/1945 MRN [id number]   [facility] 7NE-A Attending Mitzy Gleason MD   [facility] Day # 7 PCP Dwayne [place]re MD     Reason for Consultation:  Mis Lovell Surgeon: Berny Scott MD;  Location: [facility] ENDOSCOPY   • HERNIA SURGERY  2010    Dr. Mary Dunn    • OTHER SURGICAL HISTORY  12/2013 and 3/2014    video swallow study   • OTHER SURGICAL HISTORY  06/2012     Family History   Problem Relation Age of Onset   • Supple. LUNGS: non-labored respirations. ABDOMEN:  The abdomen is soft with mild incisional tenderness. BACK: Without CVA tenderness.    GENITOURINARY: woodard catheter in place draining yellow urine  Moving all extremities     Laboratory Data:  Lab R of colon without CT evidence of diverticulitis. 3. There is aortic atherosclerosis. 4. There is elevation left hemidiaphragm.    5. There is coronary artery atherosclerotic calcification and calcifications of the aortic valve.                Impression:

## 2021-06-01 NOTE — PROGRESS NOTES
POD#3 s/p wedge resection gastric tumor    No acute events. Stable and afebrile. Pain under control. Tolerating full liquids. Hgb okay. Blood pressure 108/73, pulse 80, temperature 98.2 °F (36.8 °C), temperature source Oral, resp.  rate 19, height

## 2021-06-01 NOTE — DIETARY NOTE
[facility]  NUTRITION ASSESSMENT    Pt does not meet malnutrition criteria. NUTRITION INTERVENTION:  1. RD Nutrition Care Plan - See RD nutrition assessment for additional recommendations   2. Meal and Snacks - Monitor patient po intake.  Encourage greater than 5 days, recommend alternate nutrition. ANTHROPOMETRICS:  Ht: 165.1 cm (5' 5\")  Wt: 78.9 kg (173 lb 14.4 oz). This is 122% of IBW  BMI: Body mass index is 28.94 kg/m².   IBW: 61.8 kg    WEIGHT HISTORY:   Wt Readings from Last 10 Encounters: stable within 1 to 2 lbs during admission - New  4. Return to PO intake in 24-48 hrs - Resolved  5. Start alternative nutrition in 24-48 hrs if diet is not able to advance- Resolved  6.  Tolerate alternative nutrition at 100% of goal - Resolved    MEDICATIO

## 2021-06-02 ENCOUNTER — APPOINTMENT (OUTPATIENT)
Dept: ULTRASOUND IMAGING | Facility: HOSPITAL | Age: 76
DRG: 270 | End: 2021-06-02
Attending: INTERNAL MEDICINE
Payer: MEDICARE

## 2021-06-02 PROCEDURE — 93971 EXTREMITY STUDY: CPT | Performed by: INTERNAL MEDICINE

## 2021-06-02 PROCEDURE — 92526 ORAL FUNCTION THERAPY: CPT

## 2021-06-02 PROCEDURE — 80048 BASIC METABOLIC PNL TOTAL CA: CPT | Performed by: SURGERY

## 2021-06-02 PROCEDURE — C9113 INJ PANTOPRAZOLE SODIUM, VIA: HCPCS | Performed by: INTERNAL MEDICINE

## 2021-06-02 PROCEDURE — 97116 GAIT TRAINING THERAPY: CPT

## 2021-06-02 PROCEDURE — 85025 COMPLETE CBC W/AUTO DIFF WBC: CPT | Performed by: SURGERY

## 2021-06-02 PROCEDURE — 97530 THERAPEUTIC ACTIVITIES: CPT

## 2021-06-02 PROCEDURE — 97535 SELF CARE MNGMENT TRAINING: CPT

## 2021-06-02 RX ORDER — ENOXAPARIN SODIUM 100 MG/ML
40 INJECTION SUBCUTANEOUS DAILY
Qty: 14 EACH | Refills: 0 | Status: SHIPPED | OUTPATIENT
Start: 2021-06-03 | End: 2021-06-04

## 2021-06-02 RX ORDER — TRAMADOL HYDROCHLORIDE 50 MG/1
50 TABLET ORAL EVERY 6 HOURS PRN
Qty: 20 TABLET | Refills: 0 | Status: SHIPPED | OUTPATIENT
Start: 2021-06-02 | End: 2021-06-23

## 2021-06-02 RX ORDER — ACETAMINOPHEN 500 MG
1000 TABLET ORAL EVERY 6 HOURS PRN
Qty: 30 TABLET | Refills: 0 | Status: SHIPPED | OUTPATIENT
Start: 2021-06-02 | End: 2021-06-23

## 2021-06-02 RX ORDER — POTASSIUM CHLORIDE 20 MEQ/1
40 TABLET, EXTENDED RELEASE ORAL ONCE
Status: COMPLETED | OUTPATIENT
Start: 2021-06-02 | End: 2021-06-02

## 2021-06-02 NOTE — PROGRESS NOTES
KRISTINA Hospitalist Progress Note     [facility]      SUBJECTIVE:  No acute events overnight  Feeling fine    OBJECTIVE:  Temp:  [97.6 °F (36.4 °C)-98.3 °F (36.8 °C)] 98.3 °F (36.8 °C)  Pulse:  [73-85] 85  Resp:  [18-20] 18  BP: ()/(48-70) 90/52 VIEW) (CPT=71045)  TECHNIQUE:  AP chest radiograph was obtained.   COMPARISON:  [facility] , XR, XR CHEST AP PORTABLE  (CPT=71045), 5/25/2021, 9:03 AM.  INDICATIONS:  s/p attempted central line placement  PATIENT STATED HISTORY: (As transcribed by Technologist) Technologist)  Bilateral legs weakness, trouble with balance and shortness of breath with burning in chest.    FINDINGS:  VENTRICLES/SULCI:  Ventricles and sulci are prominent compatible with atrophy.  INTRACRANIAL:  There is ectasia of the basilar artery w 100cc of Omnipaque 350  FINDINGS:  LIVER:  No enlargement, atrophy, abnormal density, or significant focal lesion. BILIARY:  High-density material in gallbladder could be gallbladder sludge or noncalcified stones.  PANCREAS:  No lesion, fluid collection, of colon without CT evidence of diverticulitis. 4. High density material in dependent part of gallbladder could represent noncalcified stones or sludge.    Dictated by (CST): Jenae Pham MD on 5/27/2021 at 2:18 PM     Finalized by (CST): Jenae Pham MD G Date: 2021 :  10/10/1945 Ht:  (65in)  BP: 110 / 80 MRN:  [id number]    Age:  75years    Wt:  (169lb) HR: 99bpm Loc:  [facility]        Gndr: M          BSA: 1.84m^2 Sonographer: ZACHARIAH Walker Ordering:    Hans Leone Consulting:  Lizbeth Jolly annulus. Transvalvular velocity was within    the normal range. There was no evidence for stenosis. There was trivial    regurgitation. 4. Left atrium: The left atrial volume was mildly increased.  5. Pulmonary arteries: Systolic pressure was moderately inc Doppler:  Transvalvular velocity was within the normal range. There was no evidence for stenosis. There was no significant regurgitation. Pericardium:  There was no pericardial effusion. Aorta:   Aortic root: The aortic root was normal. Pulmonary artery: 15.6  cm          ---------  Stroke volume (SV), LVOT DP                  59    ml          ---------  Cardiac output (Qs), LVOT DP                 6.3   L/min       ---------  Cardiac index (Qs/bsa), LVOT DP              3.4   L/(min-m^2) ---------  Reed Calhoun volume/bsa, ES, 1-p A2C                   43    ml/m^2      ---------   Mitral valve                                 Value             Reference  Mitral E-wave peak velocity                  0.96  m/sec       ---------  Mitral A-wave peak velocity information is transmitted to the ACR ([facility]) NRDR ([address]) which includes the Dose Index Registry.   PATIENT STATED HISTORY:(As transcribed by Technologist)   shortness of breath, burning in chest similar t 5/25/2021 at 1:02 PM       NM GI BLOOD LOSS STUDY SPECT/CT (SINGLE) 1 DAY (CPT=78278/78607) Once    Result Date: 5/25/2021  PROCEDURE:  NM GI BLOOD LOSS STUDY SPECT/CT(SINGLE) 1 DAY (CPT=78278/29439)  COMPARISON:  None.   INDICATION:   TECHNIQUE:  28.6 mCi (LOVENOX) 40 MG/0.4ML injection 40 mg, 40 mg, Subcutaneous, Daily  ondansetron HCl (ZOFRAN) injection 4 mg, 4 mg, Intravenous, Q6H PRN  ipratropium-albuterol (DUONEB) nebulizer solution 3 mL, 3 mL, Nebulization, Q6H PRN  atorvastatin (LIPITOR) tab 40 mg, 4 prevention: PT/OT -- to re-eval for LETITIA    Dispo: admitted    Questions/concerns were discussed with patient and/or family by bedside.     Vickii Felty  Internal Medicine  [facility]

## 2021-06-02 NOTE — CM/SW NOTE
PT saw pt today and is now recommending LETITIA. Pt is agreeable to LETITIA, per RN. Family says pt has not been anywhere for LETITIA in the past.  Referrals sent for LETITIA via [facility] - waiting for responses. DON screen requested.   SW to f/u for accepting LETITIA list to

## 2021-06-02 NOTE — PROGRESS NOTES
POD#4 s/p wedge resection gastric tumor    No acute events. Stable and afebrile. Pain under control. Tolerating diet. Hgb stable. Trejo placed for urinary retention with adequate output.       Blood pressure 106/69, pulse 76, temperature 98 °F (36.7 °C),

## 2021-06-02 NOTE — OCCUPATIONAL THERAPY NOTE
Attempted to see patient for OT services this pm, however patient currently off the floor. Will reattempt as able and as patient appropriate.

## 2021-06-02 NOTE — HOME CARE LIAISON
Received referral from Nesha Acosta. Met with patient at the bedside to discuss [facility]idential home health and provide choice. Patient provided with list of Joby James providers from [facility], patient choice is [facility]. Financial interest disclosure provided to patient.  Reside

## 2021-06-02 NOTE — PROGRESS NOTES
06/02/21 1713   Clinical Encounter Type   Visited With Patient   Continue Visiting No   Patient Spiritual Encounters   Spiritual Interventions  found pt. to be reticent, but discovered he did feel better after one week at THE [facility], and was doing \

## 2021-06-02 NOTE — OCCUPATIONAL THERAPY NOTE
OCCUPATIONAL THERAPY TREATMENT NOTE - INPATIENT     Room Number: 1634/1156-S  Session: 1   Number of Visits to Meet Established Goals: 4    Presenting Problem: MI, GI bleed, s/p gastrotomy, wedge resection of stomach 5/29/21    History related to current a with this\"    OBJECTIVE  Precautions: Bed/chair alarm    WEIGHT BEARING RESTRICTION  Weight Bearing Restriction: None                PAIN ASSESSMENT  Rating: Unable to rate  Location: abdomen  Management Techniques: Activity promotion; Body mechanics;Breat functional mobility back to bedside chair via RW and min assist; sitting via mod assist to control descent. Patient also reinforced on OT role, safety, fall prevention, pain management with good verbal understanding.    Patient End of Session: Up in chair;W perform supine to sit with supervision--> in progress  Patient will perform sit to supine with supervision--> in progress  Patient will transfer to toilet with supervision--> in progress

## 2021-06-02 NOTE — PROGRESS NOTES
[address] Cardiology  Progress Note    Kar Machuca Patient Status:  Inpatient    10/10/1945 MRN [id number]   [facility] 7NE-A Attending Cheryl Linares MD   Hosp Day # 8 PCP Flory Viveros MD     Impression:  1.  Severe 3 vessel kg)      General: Awake and alert; in no acute distress  Cardiac: Regular rate and regular rhythm; no murmurs/rubs/gallops are appreciated  Lungs: Clear to auscultation bilaterally; no accessory muscle use  Abdomen: Soft, non-tender; bowel sounds are normo

## 2021-06-02 NOTE — SLP NOTE
SPEECH DAILY NOTE - INPATIENT    ASSESSMENT & PLAN   ASSESSMENT  Pt seen for dysphagia tx to assess tolerance with recommended diet, ensure proper utilization of aspiration precautions and provide pt/family education.   Patient repositioned in bed and place plan of care per GI/surgery clearance     Discontinued        FOLLOW UP  Follow Up Needed (Documentation Required): Yes  SLP Follow-up Date: 06/03/21  Number of Visits to Meet Established Goals: 3    Session: 3    Prior to entering room, SLP donned appropr

## 2021-06-02 NOTE — PLAN OF CARE
Assumed care at 0730  A&Ox4, VSS, NSR on tele  Encouraged to get up to chair for meals; pt declined  Tolerating low fiber diet; fair appetite  Denies nausea or abdominal pain  Awaiting LETITIA placement  Pt updated on POC.  Needs attended to

## 2021-06-03 PROCEDURE — 80048 BASIC METABOLIC PNL TOTAL CA: CPT | Performed by: SURGERY

## 2021-06-03 PROCEDURE — 85025 COMPLETE CBC W/AUTO DIFF WBC: CPT | Performed by: SURGERY

## 2021-06-03 RX ORDER — PANTOPRAZOLE SODIUM 40 MG/1
40 TABLET, DELAYED RELEASE ORAL
Status: DISCONTINUED | OUTPATIENT
Start: 2021-06-03 | End: 2021-06-04

## 2021-06-03 RX ORDER — POTASSIUM CHLORIDE 20 MEQ/1
40 TABLET, EXTENDED RELEASE ORAL ONCE
Status: COMPLETED | OUTPATIENT
Start: 2021-06-03 | End: 2021-06-03

## 2021-06-03 RX ORDER — LISINOPRIL 5 MG/1
5 TABLET ORAL DAILY
Status: DISCONTINUED | OUTPATIENT
Start: 2021-06-04 | End: 2021-06-04

## 2021-06-03 NOTE — CM/SW NOTE
Joanie met with pt and he states he could probably give himself the lovenox injection. Joanie spoke to Christiane Castillo re: this and asked if he would be willing to at least assist with supervision of setup for home lovenox.   Son will be in today between 430pm and 5pm for i

## 2021-06-03 NOTE — PROGRESS NOTES
[facility] Cardiology  Progress Note    Tricia Lou Patient Status:  Inpatient    10/10/1945 MRN [id number]   [facility] 7NE-A Attending Tobin Rojas MD   [facility] Day # 9 PCP John Tierney MD     Impression:  1.  Severe 3 vessel distress  Cardiac: Regular rate and regular rhythm; no murmurs/rubs/gallops are appreciated  Lungs: Clear to auscultation bilaterally; no accessory muscle use  Abdomen: Soft, non-tender; bowel sounds are normoactive  Extremities: No clubbing/cyanosis; move

## 2021-06-03 NOTE — CM/SW NOTE
Joanie received call from son Deedee Thomas [phone]. family is not prepared to take pt home tonight and would like to dc him tomorrow. They have ordered their own hospital bed that will be delivered tomorrow and son also need to install grab bars.   Deedee Thomas will co

## 2021-06-03 NOTE — PHYSICAL THERAPY NOTE
PHYSICAL THERAPY TREATMENT NOTE - INPATIENT    Room Number: 2809/8273-Z     Session: 1  Number of Visits to Meet Established Goals: 6    Presenting Problem: STEMI, gastric mass s/p resection 5/29/21    History related to current admission: Pt is a 66 y. o. Patient’s self-stated goal is to be more independent. OBJECTIVE  Precautions: Bed/chair alarm    WEIGHT BEARING RESTRICTION  Weight Bearing Restriction: None                PAIN ASSESSMENT   Ratin  Location: abdomen  Management Techniques:  Activ backward with use of 1 rail, min assist and was unsteady. Skilled Therapy Provided: Per RN dwight to work with pt. Pt received in supine and was agreeable to therapy session.  Pt educated on role of therapy, goals for the session, surgical precautions, UE training;Strengthening;Transfer training;Balance training  Rehab Potential : Good  Frequency (Obs): 3-5x/week    CURRENT GOALS   Goal #1 Patient is able to demonstrate supine - sit EOB @ level: supervision      Goal #2 Patient is able to demonstrate transf

## 2021-06-03 NOTE — PLAN OF CARE
Assumed care at [address]. AOx4 forgetful at times. Son was at bedside. Pt now wants to go home instead of LETITIA. Son is very supportive. Pt and son wondering if home PT will be an option. Will endorse to day RN. Midline incision DERIK.   Trejo catheter in klaudia

## 2021-06-03 NOTE — PROGRESS NOTES
POD#5 s/p wedge resection gastric tumor    No acute events. Stable and afebrile. Pain under control. Tolerating diet. Hgb stable. Trejo placed for urinary retention with adequate output.       Blood pressure 100/71, pulse 71, temperature 98.1 °F (36.7 °C

## 2021-06-03 NOTE — PROGRESS NOTES
KRISTINA Hospitalist Progress Note     [facility]      SUBJECTIVE:  No acute events overnight  Feeling fine, pain controlled.  Tolerating diet    OBJECTIVE:  Temp:  [97.8 °F (36.6 °C)-98.6 °F (37 °C)] 98.3 °F (36.8 °C)  Pulse:  [71-80] 79  Resp:  [16-20] in the last 168 hours. Imaging:  XR CHEST AP/PA (1 VIEW) (CPT=71045) Once    Result Date: 5/29/2021  PROCEDURE:  XR CHEST AP/PA (1 VIEW) (CPT=71045)  TECHNIQUE:  AP chest radiograph was obtained.   COMPARISON:  [facility] , XR, XR CHEST AP PORTABLE  (CPT=710 Radiology) NRDR ([address]) which includes the Dose Index Registry.   PATIENT STATED HISTORY: (As transcribed by Technologist)  Bilateral legs weakness, trouble with balance and shortness of breath with burning in chest.    FINDINGS: Registry. PATIENT STATED HISTORY:(As transcribed by Technologist)  Finding from prev scan. Abdominal mass per chart. CONTRAST USED:  100cc of Omnipaque 350  FINDINGS:  LIVER:  No enlargement, atrophy, abnormal density, or significant focal lesion.   BILI biopsy is necessary. 2. Interval development of small bilateral pleural effusions and dependent atelectasis in lungs. 3. Diverticulosis of colon without CT evidence of diverticulitis.  4. High density material in dependent part of gallbladder could represen [phone] ---------------------------------------------------------------------------- Transthoracic Echocardiogram Name:Matthew Brush Date: 2021 :  10/10/1945 Ht:  (65in)  BP: 110 / 80 MRN:  [id number]    Age:  75years    Wt:  (169lb) HR: 99b There was no    stenosis. Trivial regurgitation. Peak velocity (S): 2.13m/sec. Mean    gradient (S): 9mm Hg. 3. Mitral valve: Mildly calcified annulus. Transvalvular velocity was within    the normal range. There was no evidence for stenosis.  There was tri was within the normal range. There was no evidence for stenosis. There was trivial regurgitation. Pulmonic valve:   Not well visualized. Doppler:  Transvalvular velocity was within the normal range. There was no evidence for stenosis.  There was no signifi ---------  LVOT mean velocity, S                        0.58  m/sec       ---------  LVOT VTI, S                                  15.6  cm          ---------  Stroke volume (SV), LVOT DP                  59    ml          ---------  Cardiac output (Qs ES, 1-p A4C                   38    ml/m^2      ---------  LA volume, ES, 1-p A2C               (H)     79    ml          18 - 58  LA volume/bsa, ES, 1-p A2C                   43    ml/m^2      ---------   Mitral valve                                 Value non-ionic intravenous contrast material. Post contrast coronal MPR imaging was performed. Dose reduction techniques were used.  Dose information is transmitted to the ACR ([address] [place] [address] of Radiology) Nesha Jade 35 ([address]) which include calcifications of the aortic valve.    Dictated by (CST): Shoaib Browning MD on 5/25/2021 at 12:56 PM     Finalized by (CST): Shoaib Browning MD on 5/25/2021 at 1:02 PM       NM GI BLOOD LOSS STUDY SPECT/CT (SINGLE) 1 DAY (CPT=78278/64294) Once    Result Date: HCl (FLOMAX) cap 0.4 mg, 0.4 mg, Oral, Daily  aspirin chewable tab 81 mg, 81 mg, Oral, Daily  acetaminophen (TYLENOL EXTRA STRENGTH) tab 1,000 mg, 1,000 mg, Oral, Q6H  traMADol HCl (ULTRAM) tab 50 mg, 50 mg, Oral, Q6H PRN  Enoxaparin Sodium (LOVENOX) 40 MG thrombus, no dvt    Prophy:  DVT: lovenox  Deconditioning prevention: PT/OT     Dispo: likely dc tomorrow, home with family - family making arrangements to get him home    Questions/concerns were discussed with patient and/or family by bedside.     Michelle Ulloa

## 2021-06-04 VITALS
WEIGHT: 170.88 LBS | BODY MASS INDEX: 28.47 KG/M2 | DIASTOLIC BLOOD PRESSURE: 70 MMHG | SYSTOLIC BLOOD PRESSURE: 99 MMHG | HEART RATE: 78 BPM | OXYGEN SATURATION: 95 % | TEMPERATURE: 98 F | HEIGHT: 65 IN | RESPIRATION RATE: 26 BRPM

## 2021-06-04 PROCEDURE — 92526 ORAL FUNCTION THERAPY: CPT

## 2021-06-04 RX ORDER — TAMSULOSIN HYDROCHLORIDE 0.4 MG/1
0.4 CAPSULE ORAL DAILY
Qty: 30 CAPSULE | Refills: 0 | Status: SHIPPED | OUTPATIENT
Start: 2021-06-05 | End: 2021-07-05

## 2021-06-04 RX ORDER — ENOXAPARIN SODIUM 100 MG/ML
40 INJECTION SUBCUTANEOUS DAILY
Qty: 14 EACH | Refills: 0 | Status: SHIPPED | OUTPATIENT
Start: 2021-06-04 | End: 2021-06-23 | Stop reason: ALTCHOICE

## 2021-06-04 RX ORDER — LISINOPRIL 5 MG/1
5 TABLET ORAL DAILY
Qty: 90 TABLET | Refills: 3 | Status: SHIPPED | OUTPATIENT
Start: 2021-06-04 | End: 2021-08-31

## 2021-06-04 RX ORDER — METOPROLOL SUCCINATE 25 MG/1
25 TABLET, EXTENDED RELEASE ORAL
Qty: 90 TABLET | Refills: 3 | Status: SHIPPED | OUTPATIENT
Start: 2021-06-05 | End: 2021-06-28

## 2021-06-04 RX ORDER — ATORVASTATIN CALCIUM 40 MG/1
40 TABLET, FILM COATED ORAL NIGHTLY
Qty: 90 TABLET | Refills: 3 | Status: SHIPPED | OUTPATIENT
Start: 2021-06-04 | End: 2021-08-31

## 2021-06-04 RX ORDER — PANTOPRAZOLE SODIUM 40 MG/1
40 TABLET, DELAYED RELEASE ORAL
Qty: 30 TABLET | Refills: 0 | Status: SHIPPED | OUTPATIENT
Start: 2021-06-05 | End: 2021-09-03 | Stop reason: ALTCHOICE

## 2021-06-04 NOTE — CM/SW NOTE
Sw spoke to son- they have DME in place and will  pt this afternoon. Asked RN to send script for lovenox down to [facility] to check coverage. [facility] aware of dc.     Cheri Torres LCSW  /Discharge Planner  [phone]

## 2021-06-04 NOTE — SLP NOTE
SPEECH DAILY NOTE - INPATIENT    ASSESSMENT & PLAN   ASSESSMENT  Pt seen for dysphagia tx to assess tolerance with recommended diet, ensure proper utilization of aspiration precautions and provide pt/family education. Patient alert and up in bed.   Reviewe any questions, please contact Cecily Lara 87 CCC-SLP  Pager [phone]

## 2021-06-04 NOTE — DISCHARGE SUMMARY
General Medicine Discharge Summary     Patient ID:  Manuel Carrillo  66year old  10/10/1945    Admit date: 5/25/2021    Discharge date and time: 6/4/21    Attending Physician: Keyana Waite DO after cath which was then removed  - case was discussed with CV surgery per cardiology -- not a candidate for CABG and felt he may not benefit from PCI either so medical management  - Statin, BB, ASA, ACE I     # Acute blood loss anemia secondary to ulcera lisinopril 5 MG Tabs      Take 1 tablet (5 mg total) by mouth daily. Quantity: 90 tablet  Refills: 3     Metoprolol Succinate ER 25 MG Tb24  Commonly known as:  Toprol XL  Start taking on: June 5, 2021      Take 1 tablet (25 mg total) by mouth Daily Bet liquids  Wound Care: as directed  Code Status: Full Code      Exam on day of discharge:      06/04/21  0745   BP: 97/62   Pulse: 74   Resp: 18   Temp: 98.1 °F (36.7 °C)       General: no acute distress, alert and oriented x 3  Heart: RRR  Lungs: clear bila

## 2021-06-04 NOTE — PROGRESS NOTES
[facility] Cardiology  Progress Note    Manuel Rater Patient Status:  Inpatient    10/10/1945 MRN [id number]   [facility] 7NE-A Attending Saima Blanchard MD   [facility] Day # 10 PCP Nate Wall MD     Impression:  1.  Severe 3 vesse acute distress  Cardiac: Regular rate and regular rhythm; no murmurs/rubs/gallops are appreciated  Lungs: Clear to auscultation bilaterally; no accessory muscle use  Abdomen: Soft, non-tender; bowel sounds are normoactive  Extremities: No clubbing/cyanosis

## 2021-06-04 NOTE — PLAN OF CARE
Assumed care at [address] [place] [address] intact  Midline incision anthony c/d/i  Needs met will continue to monitor

## 2021-06-04 NOTE — PLAN OF CARE
Assumed care at 0700. Pt A/O x4, forgetful at times. RA. NSR on tele. VSS. Midline incision with staples is DERIK. Trejo bag in place. Tolerating low fiber diet. All consults cleared for discharge. Meds filled through [facility].  All consults cleared fo

## 2021-06-04 NOTE — PLAN OF CARE
Assumed pt care at 0730  VSS, A&Ox4, forgetful at times  Denies pain, scheduled tylenol  Midline incision c/d/I  Dc planning to home with [facility]  Woodard changed to leg bag  Lovenox education and woodard care education provided with pt and family verified with tea

## 2021-06-04 NOTE — PROGRESS NOTES
POD#6 s/p wedge resection gastric tumor    No acute events. Stable and afebrile. Pain under control. Awaiting discharge. Blood pressure 97/62, pulse 74, temperature 98.1 °F (36.7 °C), temperature source Oral, resp.  rate 18, height 1.651 m (5' 5\")

## 2021-06-04 NOTE — CM/SW NOTE
Scripts including [facility]enox [facility]-copay for lovenox $93.00     Ute Curry LCSW  /Discharge Planner  [phone]

## 2021-06-07 ENCOUNTER — TELEPHONE (OUTPATIENT)
Dept: SURGERY | Facility: CLINIC | Age: 76
End: 2021-06-07

## 2021-06-07 NOTE — TELEPHONE ENCOUNTER
Called to check on patient from hospital discharge. Pt stated that he was having no pain at this time. Pt stated no nausea, vomiting, or fevers. Stated he has been getting lovenox injection with no issues. He has had several bowel movements.   Trejo cath Detail Level: Detailed Detail Level: Generalized Detail Level: Simple

## 2021-06-09 DIAGNOSIS — R06.6 HICCUPS: ICD-10-CM

## 2021-06-09 RX ORDER — METOCLOPRAMIDE 10 MG/1
TABLET ORAL
Qty: 15 TABLET | Refills: 0 | OUTPATIENT
Start: 2021-06-09

## 2021-06-10 ENCOUNTER — OFFICE VISIT (OUTPATIENT)
Dept: SURGERY | Facility: CLINIC | Age: 76
End: 2021-06-10
Payer: MEDICARE

## 2021-06-10 VITALS
SYSTOLIC BLOOD PRESSURE: 99 MMHG | OXYGEN SATURATION: 97 % | RESPIRATION RATE: 18 BRPM | TEMPERATURE: 99 F | HEART RATE: 87 BPM | DIASTOLIC BLOOD PRESSURE: 62 MMHG

## 2021-06-10 DIAGNOSIS — R06.6 INTRACTABLE HICCUPS: Primary | ICD-10-CM

## 2021-06-10 DIAGNOSIS — R53.1 WEAKNESS: ICD-10-CM

## 2021-06-10 DIAGNOSIS — C49.A2 GASTROINTESTINAL STROMAL TUMOR (GIST) OF STOMACH (HCC): Primary | ICD-10-CM

## 2021-06-10 PROBLEM — K92.2 GASTROINTESTINAL HEMORRHAGE: Status: RESOLVED | Noted: 2021-05-25 | Resolved: 2021-06-10

## 2021-06-10 PROCEDURE — 80053 COMPREHEN METABOLIC PANEL: CPT | Performed by: PHYSICIAN ASSISTANT

## 2021-06-10 PROCEDURE — 3074F SYST BP LT 130 MM HG: CPT | Performed by: PHYSICIAN ASSISTANT

## 2021-06-10 PROCEDURE — 85025 COMPLETE CBC W/AUTO DIFF WBC: CPT | Performed by: PHYSICIAN ASSISTANT

## 2021-06-10 PROCEDURE — 3078F DIAST BP <80 MM HG: CPT | Performed by: PHYSICIAN ASSISTANT

## 2021-06-10 PROCEDURE — 99024 POSTOP FOLLOW-UP VISIT: CPT | Performed by: PHYSICIAN ASSISTANT

## 2021-06-10 RX ORDER — BACLOFEN 5 MG/1
5 TABLET ORAL 3 TIMES DAILY PRN
Qty: 15 TABLET | Refills: 0 | Status: SHIPPED | OUTPATIENT
Start: 2021-06-10 | End: 2021-06-15

## 2021-06-10 NOTE — PROGRESS NOTES
[facility]        Patient Name:  Cristian Garcia   YOB: 1945   Gender:  Male   Appt Date:  6/10/2021   Provider:  STEPHANI Urbina     PATIENT PROVIDERS  Primary Care Provider:Dante Solitario MD   Address: [address] [place] [address] mass.  His hospital course post-operatively was unremarkable. He presents today for post-operative visit.      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   -The margin of resection is negative.  -See comment.      Electronically signed by Kathy Villar MD on 6/4/2021 at

## 2021-06-13 PROBLEM — I77.89 ECTASIA OF ARTERY (HCC): Status: RESOLVED | Noted: 2021-04-26 | Resolved: 2021-06-13

## 2021-06-13 PROBLEM — R79.89 ELEVATED TROPONIN: Status: RESOLVED | Noted: 2021-05-25 | Resolved: 2021-06-13

## 2021-06-13 PROBLEM — R40.0 SOMNOLENCE: Status: RESOLVED | Noted: 2019-11-25 | Resolved: 2021-06-13

## 2021-06-13 PROBLEM — R77.8 ELEVATED TROPONIN: Status: RESOLVED | Noted: 2021-05-25 | Resolved: 2021-06-13

## 2021-06-13 PROBLEM — R79.89 AZOTEMIA: Status: RESOLVED | Noted: 2021-05-25 | Resolved: 2021-06-13

## 2021-06-14 ENCOUNTER — OFFICE VISIT (OUTPATIENT)
Dept: SURGERY | Facility: CLINIC | Age: 76
End: 2021-06-14
Payer: MEDICARE

## 2021-06-14 ENCOUNTER — OFFICE VISIT (OUTPATIENT)
Dept: HEMATOLOGY/ONCOLOGY | Facility: HOSPITAL | Age: 76
End: 2021-06-14
Attending: INTERNAL MEDICINE
Payer: MEDICARE

## 2021-06-14 VITALS
WEIGHT: 163.63 LBS | SYSTOLIC BLOOD PRESSURE: 94 MMHG | RESPIRATION RATE: 18 BRPM | DIASTOLIC BLOOD PRESSURE: 63 MMHG | HEART RATE: 78 BPM | BODY MASS INDEX: 27 KG/M2 | TEMPERATURE: 98 F | OXYGEN SATURATION: 97 %

## 2021-06-14 VITALS
WEIGHT: 161.81 LBS | RESPIRATION RATE: 16 BRPM | TEMPERATURE: 97 F | OXYGEN SATURATION: 96 % | DIASTOLIC BLOOD PRESSURE: 63 MMHG | HEART RATE: 67 BPM | BODY MASS INDEX: 27 KG/M2 | SYSTOLIC BLOOD PRESSURE: 92 MMHG

## 2021-06-14 DIAGNOSIS — C49.A2 GASTROINTESTINAL STROMAL TUMOR (GIST) OF STOMACH (HCC): Primary | ICD-10-CM

## 2021-06-14 DIAGNOSIS — Z85.89 HISTORY OF HEAD AND NECK CANCER: ICD-10-CM

## 2021-06-14 PROCEDURE — 3078F DIAST BP <80 MM HG: CPT | Performed by: PHYSICIAN ASSISTANT

## 2021-06-14 PROCEDURE — 3074F SYST BP LT 130 MM HG: CPT | Performed by: PHYSICIAN ASSISTANT

## 2021-06-14 PROCEDURE — 99215 OFFICE O/P EST HI 40 MIN: CPT | Performed by: INTERNAL MEDICINE

## 2021-06-14 PROCEDURE — 99024 POSTOP FOLLOW-UP VISIT: CPT | Performed by: PHYSICIAN ASSISTANT

## 2021-06-14 PROCEDURE — 1111F DSCHRG MED/CURRENT MED MERGE: CPT | Performed by: PHYSICIAN ASSISTANT

## 2021-06-14 NOTE — PROGRESS NOTES
[facility]        Patient Name:  Patricia Medina   YOB: 1945   Gender:  Male   Appt Date:  6/14/2021   Provider:  STEPHANI Otero     PATIENT PROVIDERS  Primary Care Provider:Dante Solitario MD   Address: [address] [place] [address] Temp 96.8 °F (36 °C) (Tympanic)   Resp 16   Wt 73.4 kg (161 lb 12.8 oz)   SpO2 96%   BMI 26.92 kg/m²      Medications Reviewed:    Current Outpatient Medications:   •  baclofen 5 MG Oral Tab, Take 1 tablet (5 mg total) by mouth 3 (three) times daily as n • High blood pressure    • High cholesterol    • History of blood clots     DVT   • HTN (hypertension) 12/2/2013   • Hyperlipidemia    • Low back pain    • 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   Skin: Inspection and palpation: no jaundice. 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   -The margin of resection is negative.  -See comment.      Electronically s

## 2021-06-14 NOTE — PROGRESS NOTES
Cancer Center Progress Note  Patient Name: Leyla Kaur   YOB: 1945   Medical Record Number: [id number]   CSN: [id number]    Attending Physician: Efra Rider M.D.      Date of Visit: 6/14/2021    Chief Complaint:  Patient presents w c/w GIST. FNA could not be performed. On 5/29/21, he underwent wedge resection of the gastric mass. 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   -The margin of resection is Procedure Laterality Date   • COLONOSCOPY N/A 5/27/2021    Procedure: COLONOSCOPY;  Surgeon: Raiza Garcia MD;  Location: [facility] ENDOSCOPY   • HERNIA SURGERY  2010    Dr. Ely Gayle    • OTHER SURGICAL HISTORY  12/2013 and 3/2014    video swallow study   • Transportation Needs:       Lack of Transportation (Medical):       Lack of Transportation (Non-Medical):   Physical Activity:       Days of Exercise per Week:       Minutes of Exercise per Session:   Stress:       Feeling of Stress :   Social Connection Disp: 30 tablet, Rfl: 0  •  aspirin 81 MG Oral Chew Tab, Chew 1 tablet (81 mg total) by mouth daily. , Disp: 30 tablet, Rfl: 0    Allergies:    Scallops                DIARRHEA, NAUSEA AND VOMITING     Review of Systems:    Constitutional No fevers, chills, function, normal gait, cranial nerves intact. Psychiatric Normal - A&Ox3. Coherent speech. Verbalizes understanding of our discussions today. Neck    Abnormal - Post-surgical and chronic radiation changes unchanged.       Laboratory:  Results for Júnior Velez Latest Ref Range: 0.00 - 0.10 x10(3) uL 0.02   Immature Granulocyte Absolute Latest Ref Range: 0.00 - 1.00 x10(3) uL 0.04   Neutrophils % Latest Units: % 80.1   Lymphocytes % Latest Units: % 10.0   Monocytes % Latest Units: % 4.8   Eosinophils % Latest Uni on the NIH classification scheme. However, data from some studies demonstrate a 96% long term progression free survival with surgery alone in patients with Gastric GIST 5-10cm in size with <= 5 mitoses/hpf.  We discussed the risks and benefits of adjuvant G

## 2021-06-14 NOTE — PROGRESS NOTES
Nutrition Consultation as per surgical oncology PA    Patient Name: Bianca Holley  YOB: 1945  Medical Record Number: [id number]   Account Number: [de-identified]  Dietitian: Timo Swain RD, KANIKA      Date of visit: 6/14/2021    Diet Rx: hig Disp: 90 tablet, Rfl: 3  •  Metoprolol Succinate ER 25 MG Oral Tablet 24 Hr, Take 1 tablet (25 mg total) by mouth Daily Beta Blocker. , Disp: 90 tablet, Rfl: 3  •  tamsulosin (FLOMAX) cap, Take 1 capsule (0.4 mg total) by mouth daily. , Disp: 30 capsule, Rfl

## 2021-06-23 PROBLEM — C49.A4 GIST (GASTROINTESTINAL STROMA TUMOR), MALIGNANT, COLON (HCC): Status: ACTIVE | Noted: 2021-06-23

## 2021-06-23 PROBLEM — G47.9 SLEEP DISTURBANCE: Status: ACTIVE | Noted: 2021-06-23

## 2021-06-28 PROBLEM — I50.22 CHRONIC SYSTOLIC CONGESTIVE HEART FAILURE (HCC): Status: ACTIVE | Noted: 2021-06-28

## 2021-07-06 PROBLEM — I25.10 CORONARY ARTERY DISEASE INVOLVING NATIVE CORONARY ARTERY OF NATIVE HEART WITHOUT ANGINA PECTORIS: Status: ACTIVE | Noted: 2021-07-06

## 2021-09-02 PROBLEM — I21.4 NSTEMI (NON-ST ELEVATED MYOCARDIAL INFARCTION) (HCC): Status: RESOLVED | Noted: 2019-12-09 | Resolved: 2021-09-02

## 2021-10-18 ENCOUNTER — APPOINTMENT (OUTPATIENT)
Dept: CT IMAGING | Facility: HOSPITAL | Age: 76
DRG: 291 | End: 2021-10-18
Attending: EMERGENCY MEDICINE
Payer: MEDICARE

## 2021-10-18 ENCOUNTER — HOSPITAL ENCOUNTER (INPATIENT)
Facility: HOSPITAL | Age: 76
LOS: 3 days | Discharge: HOME HEALTH | DRG: 291 | End: 2021-10-21
Attending: EMERGENCY MEDICINE | Admitting: HOSPITALIST
Payer: MEDICARE

## 2021-10-18 ENCOUNTER — APPOINTMENT (OUTPATIENT)
Dept: GENERAL RADIOLOGY | Facility: HOSPITAL | Age: 76
DRG: 291 | End: 2021-10-18
Attending: EMERGENCY MEDICINE
Payer: MEDICARE

## 2021-10-18 DIAGNOSIS — I50.23 ACUTE ON CHRONIC SYSTOLIC CHF (CONGESTIVE HEART FAILURE) (HCC): Primary | ICD-10-CM

## 2021-10-18 DIAGNOSIS — J90 BILATERAL PLEURAL EFFUSION: ICD-10-CM

## 2021-10-18 PROCEDURE — 71045 X-RAY EXAM CHEST 1 VIEW: CPT | Performed by: EMERGENCY MEDICINE

## 2021-10-18 PROCEDURE — 70450 CT HEAD/BRAIN W/O DYE: CPT | Performed by: EMERGENCY MEDICINE

## 2021-10-18 RX ORDER — ACETAMINOPHEN 325 MG/1
650 TABLET ORAL EVERY 6 HOURS PRN
Status: DISCONTINUED | OUTPATIENT
Start: 2021-10-18 | End: 2021-10-21

## 2021-10-18 RX ORDER — LISINOPRIL 5 MG/1
5 TABLET ORAL DAILY
Status: DISCONTINUED | OUTPATIENT
Start: 2021-10-18 | End: 2021-10-20

## 2021-10-18 RX ORDER — SODIUM PHOSPHATE, DIBASIC AND SODIUM PHOSPHATE, MONOBASIC 7; 19 G/133ML; G/133ML
1 ENEMA RECTAL ONCE AS NEEDED
Status: DISCONTINUED | OUTPATIENT
Start: 2021-10-18 | End: 2021-10-21

## 2021-10-18 RX ORDER — MELATONIN
3 NIGHTLY PRN
Status: DISCONTINUED | OUTPATIENT
Start: 2021-10-18 | End: 2021-10-21

## 2021-10-18 RX ORDER — BISACODYL 10 MG
10 SUPPOSITORY, RECTAL RECTAL
Status: DISCONTINUED | OUTPATIENT
Start: 2021-10-18 | End: 2021-10-21

## 2021-10-18 RX ORDER — METOCLOPRAMIDE HYDROCHLORIDE 5 MG/ML
10 INJECTION INTRAMUSCULAR; INTRAVENOUS EVERY 8 HOURS PRN
Status: DISCONTINUED | OUTPATIENT
Start: 2021-10-18 | End: 2021-10-21

## 2021-10-18 RX ORDER — ONDANSETRON 2 MG/ML
4 INJECTION INTRAMUSCULAR; INTRAVENOUS EVERY 6 HOURS PRN
Status: DISCONTINUED | OUTPATIENT
Start: 2021-10-18 | End: 2021-10-21

## 2021-10-18 RX ORDER — HEPARIN SODIUM 5000 [USP'U]/ML
5000 INJECTION, SOLUTION INTRAVENOUS; SUBCUTANEOUS EVERY 8 HOURS SCHEDULED
Status: DISCONTINUED | OUTPATIENT
Start: 2021-10-18 | End: 2021-10-21

## 2021-10-18 RX ORDER — POLYETHYLENE GLYCOL 3350 17 G/17G
17 POWDER, FOR SOLUTION ORAL DAILY PRN
Status: DISCONTINUED | OUTPATIENT
Start: 2021-10-18 | End: 2021-10-21

## 2021-10-18 RX ORDER — ONDANSETRON 2 MG/ML
4 INJECTION INTRAMUSCULAR; INTRAVENOUS EVERY 4 HOURS PRN
Status: DISCONTINUED | OUTPATIENT
Start: 2021-10-18 | End: 2021-10-18

## 2021-10-18 RX ORDER — FUROSEMIDE 10 MG/ML
40 INJECTION INTRAMUSCULAR; INTRAVENOUS
Status: DISCONTINUED | OUTPATIENT
Start: 2021-10-18 | End: 2021-10-21

## 2021-10-18 RX ORDER — MELATONIN
325
Status: DISCONTINUED | OUTPATIENT
Start: 2021-10-19 | End: 2021-10-21

## 2021-10-18 RX ORDER — ATORVASTATIN CALCIUM 40 MG/1
40 TABLET, FILM COATED ORAL NIGHTLY
Status: DISCONTINUED | OUTPATIENT
Start: 2021-10-18 | End: 2021-10-21

## 2021-10-18 RX ORDER — DOCUSATE SODIUM 100 MG/1
100 CAPSULE, LIQUID FILLED ORAL 2 TIMES DAILY
Status: DISCONTINUED | OUTPATIENT
Start: 2021-10-18 | End: 2021-10-21

## 2021-10-18 RX ORDER — FUROSEMIDE 10 MG/ML
40 INJECTION INTRAMUSCULAR; INTRAVENOUS ONCE
Status: COMPLETED | OUTPATIENT
Start: 2021-10-18 | End: 2021-10-18

## 2021-10-18 NOTE — H&P
K[facility]STINA Hospitalist H&P       CC: Patient presents with:  Numbness Weakness  Swelling Edema       PCP: Madelaine García MD    History of Present Illness:    66-year-old history of systolic heart failure with an EF of 20 to 25%, CAD-with unsuccessful PCI, GIST tu Osteoarthritis of lumbar spine, unspecified spinal osteoarthritis complication status 3/30/2495   • Personal history of antineoplastic chemotherapy    • Pneumonia of left upper lobe due to infectious organism 11/26/2019   • Unspecified essential hypertensi OBJECTIVE:  /77   Pulse 67   Temp 97.9 °F (36.6 °C) (Temporal)   Resp 21   Ht 5' 5\" (1.651 m)   Wt 140 lb (63.5 kg)   SpO2 100%   BMI 23.30 kg/m²     BP Readings from Last 3 Encounters:  10/18/21 : 117/77  09/03/21 : 106/78  06/28/21 : 130/80 Dose Index Registry. PATIENT STATED HISTORY: (As transcribed by Technologist)  Increased weakness to legs    FINDINGS:  VENTRICLES/SULCI:  Ventricles and sulci are prominent consistent with atrophy.  INTRACRANIAL:  There is moderate low-attenuation within 10/18/2021 at 2:04 PM     Finalized by (CST): Rosana Ferguson MD on 10/18/2021 at 2:06 PM              ASSESSMENT / PLAN:      64year old male   history of systolic heart failure with an EF of 20 to 25%, CAD-with unsuccessful PCI, GIST tumor with symptomatic a

## 2021-10-18 NOTE — ED QUICK NOTES
Orders for admission, patient is aware of plan and ready to go upstairs. Any questions, please call ED RN Mar Tadeo  at [address] [place] [address]. Vaccinated?  Yes  Type of COVID test sent: Rapid  COVID Suspicion level: Low      Titratable drug(s) infusing:  Rate: N

## 2021-10-18 NOTE — ED INITIAL ASSESSMENT (HPI)
Pt has had increased weakness to his legs, Pt has a hx of abdominal surgery to repair a bleed 5 months prior. Pt has increased edema to his hands and face with mottling to the legs noted.  Pt reportedly fell 4 days prior, pt's son states he did not strike h

## 2021-10-18 NOTE — ED QUICK NOTES
Pt resting in bed at this time. No c/o pain at this time. Edema still noted to bilateral hands and under the L. Eye which his son reports as new onset.

## 2021-10-18 NOTE — CONSULTS
[facility] Cardiology  Consultation Note      Cristian Garcia Patient Status:  Emergency    10/10/1945 MRN [id number]   Location [address] [facility] Attending Ritu Laurent MD   Hosp Day # 0 PCP Alexa Rey MD     Reason for Diagnosis Date   • Basal cell carcinoma, eyelid 12/2/2013   • Chronic back pain 12/2/2013   • DJD (degenerative joint disease), cervical 5/19/2016   • [facility]) [facility] 12/2/2013   • Exposure to medical diagnostic radiation    • Head and negative for melena  Genitourinary:negative for hematuria  Hematologic/lymphatic: negative for bleeding  Musculoskeletal:negative for myalgias  Neurological: negative for dizziness and headaches  Endocrine: negative for temperature intolerance      Physica 10/18/2021    ALT 76 10/18/2021    TROP <0.045 10/18/2021         Thank you for allowing our practice to participate in the care of your patient. Please do not hesitate to contact me if you have any questions.     Charline Davis MD  10/18/2021  3:21 PM

## 2021-10-18 NOTE — ED QUICK NOTES
Pt noted to have severe edema under the L. Eye, along with pitting edema to his hands, and legs, along with what appears to be mottling to bilateral legs.

## 2021-10-18 NOTE — ED PROVIDER NOTES
Patient Seen in: [facility] Emergency Department      History   Patient presents with:  Numbness Weakness  Swelling Edema    Stated Complaint: abd surg 3 weeks ago, legs feeling progressively weak \"rubbery\" since then, rpt*    Subjective:   HPI infectious organism 11/26/2019   • Unspecified essential hypertension     benign              Past Surgical History:   Procedure Laterality Date   • COLONOSCOPY N/A 5/27/2021    Procedure: COLONOSCOPY;  Surgeon: Taylor Jaramillo MD;  Location: [facility] ENDOSCO are no rashes. EXTREMITIES: The patient moves all 4 extremities freely. There is edema to the upper and lower extremities, bilaterally. NEUROLOGIC: The patient is awake, alert, and oriented x3. Cranial nerves are grossly intact.  There is no gross mony Date: 10/18/2021  PROCEDURE:  CT BRAIN OR HEAD ([id number])  COMPARISON:  MARIA E , CT, CT BRAIN OR HEAD ([id number]), 5/25/2021, 9:32 AM.  INDICATIONS:  head pain or injury  TECHNIQUE:  Noncontrast CT scanning is performed through the brain.  Dose reduction technique hemithorax consistent with large pleural effusion, underlying  atelectasis/consolidation cannot be excluded. Blunting of the right costophrenic angle consistent with small pleural effusion versus reaction. No pneumothorax. CONCLUSION:  1.  Miguel Sale on chronic systolic CHF (congestive heart failure) [facility] I50.23 10/18/2021 Unknown

## 2021-10-19 ENCOUNTER — APPOINTMENT (OUTPATIENT)
Dept: CV DIAGNOSTICS | Facility: HOSPITAL | Age: 76
DRG: 291 | End: 2021-10-19
Attending: INTERNAL MEDICINE
Payer: MEDICARE

## 2021-10-19 PROCEDURE — 93306 TTE W/DOPPLER COMPLETE: CPT | Performed by: INTERNAL MEDICINE

## 2021-10-19 NOTE — PROGRESS NOTES
KIRSTINA Hospitalist Progress Note     [facility]      SUBJECTIVE:      Patient states that he is doing well today no chest pain no trouble breathing has been able to void significant amount-Via an external catheter  Patient has been evaluated by speech ([address]) which includes the Dose Index Registry. PATIENT STATED HISTORY: (As transcribed by Technologist)  Increased weakness to legs    FINDINGS:  VENTRICLES/SULCI:  Ventricles and sulci are prominent consistent with atrophy.  IN cannot be excluded. Dictated by (CST): Vita Gonzales MD on 10/18/2021 at 2:04 PM     Finalized by (CST): Vita Gonzales MD on 10/18/2021 at 2:06 PM          Meds:   No current outpatient medications on file.       furosemide (LASIX) injection 40 mg, 40 mg, Intr resolved  -If persistently hypoglycemic -consider D5 water infusion     #Hypertension  -Resume ACE-lisinopril 5, holding beta-blocker for now  -BP well controlled currently     #Hyperlipidemia  -Statin     #Ischemic CAD with unsuccessful PCI and not a cand

## 2021-10-19 NOTE — CM/SW NOTE
10/19/21 1000   CM/SW Referral Data   Referral Source Social Work (self-referral)   Reason for Referral Discharge planning   Informant Patient; Son   Patient Info   Patient's Current Mental Status at Time of Assessment Alert;Oriented   Patient Communicat

## 2021-10-19 NOTE — SLP NOTE
ADULT SWALLOWING EVALUATION    ASSESSMENT    ASSESSMENT/OVERALL IMPRESSION:  Patient is a 63 y/o male admitted with weakness and PMHx significant for heart failure, CAD, GIST tumor, head and neck cancer s/p radiation therapy, and DVT.  Patient known to this Problem:    Acute on chronic systolic CHF (congestive heart failure) (HCC)  Active Problems:    Bilateral pleural effusion      Past Medical History  Past Medical History:   Diagnosis Date   • Basal cell carcinoma, eyelid 12/2/2013   • Chronic back pain 12 Unable to assess       Voice Quality: Clear  Respiratory Status: Unlabored  Consistencies Trialed: Thin liquids; Nectar thick liquids;Puree; Soft solid  Method of Presentation: Staff/Clinician assistance  Patient Positioning: Upright;Midline    Oral Phase of

## 2021-10-19 NOTE — PROGRESS NOTES
[facility] Cardiology  Progress Note    Graham Painter Patient Status:  Inpatient    10/10/1945 MRN [id number]   [facility] 2NE-A Attending Mau Vasquez MD   Hosp Day # 1 PCP Donita Blizzard, MD        Reason for consultation: ac normoactive  Extremities: No clubbing/cyanosis; moves all 4 extremities normally. 2+ B LE edema, slight skin pruning.      furosemide (LASIX) injection 40 mg, 40 mg, Intravenous, BID (Diuretic)  atorvastatin (LIPITOR) tab 40 mg, 40 mg, Oral, Nightly  ferrou

## 2021-10-19 NOTE — PHYSICAL THERAPY NOTE
PHYSICAL THERAPY EVALUATION - INPATIENT     Room Number: 9712/7636-Q  Evaluation Date: 10/19/2021  Type of Evaluation: Initial  Physician Order: PT Eval and Treat    Presenting Problem: Acute on chronic CHF  Reason for Therapy: Mobility Dysfunction and D Unspecified essential hypertension     benign   • Visual impairment        Past Surgical History  Past Surgical History:   Procedure Laterality Date   • COLONOSCOPY N/A 5/27/2021    Procedure: COLONOSCOPY;  Surgeon: Raiza Garcia MD;  Location: [address] [place] [address] adjusting bedclothes, sheets and blankets)?: A Little   -   Sitting down on and standing up from a chair with arms (e.g., wheelchair, bedside commode, etc.): A Little   -   Moving from lying on back to sitting on the side of the bed?: A Little   How much h cardiopulmonary endurance, 2) impaired muscular endurance, 3) impaired BLE force generating capacity and 4) impaired static and dynamic sitting and standing balance impairments.   These impairments and comorbidities manifest themselves as functional limitat

## 2021-10-19 NOTE — PROGRESS NOTES
Assumed care 0700  Tele NSR with PVCs  O2 sat 96% room air. Still with swelling upper and lower ext. IV Lasix as order. K 3.5 replaced per protocol. Up in chair witl all meals. Aspiration precaution. Fluid restriction. Daily wt. Will continue POC.

## 2021-10-19 NOTE — OCCUPATIONAL THERAPY NOTE
OCCUPATIONAL THERAPY QUICK EVALUATION - INPATIENT    Room Number: 2592/1617-Q  Evaluation Date: 10/19/2021     Type of Evaluation: Initial & Quick Eval  Presenting Problem: CHF    Physician Order: IP Consult to Occupational Therapy  Reason for Therapy:  AD use    Therapist comments: Pt has fully adapted house with DME for wife, pt typically does not use but would be able to, pt does not need more DME at this time. Patient End of Session: Up in chair;Needs met;Call light within reach; All patient questions gloves  Patient/family PPE: Mask not worn

## 2021-10-19 NOTE — PLAN OF CARE
Received patient, alert but forgetful and [place]. Denied chest pain, denied SOB at rest. Admission data completed with son's help [facility] NAYE. Discussed POC. Due meds given. Safety measures reinforced, call light within reach. Bed alarm on.  Monitored I&O, daily jess

## 2021-10-20 ENCOUNTER — APPOINTMENT (OUTPATIENT)
Dept: GENERAL RADIOLOGY | Facility: HOSPITAL | Age: 76
DRG: 291 | End: 2021-10-20
Attending: HOSPITALIST
Payer: MEDICARE

## 2021-10-20 PROCEDURE — 74230 X-RAY XM SWLNG FUNCJ C+: CPT | Performed by: HOSPITALIST

## 2021-10-20 NOTE — PROGRESS NOTES
KRISTINA Hospitalist Progress Note     [facility]      SUBJECTIVE:    Patient was evaluated by speech yesterday and recc mechaniccal soft chopped diet and nectar thick liquids, evaluated by speech again today and rec strict NPO -patient more awake today information is transmitted to the A[employer]) NRDR ([address]) which includes the Dose Index Registry.   PATIENT STATED HISTORY: (As transcribed by Technologist)  Increased weakness to legs    FINDINGS:  VENTRI effusions, left greater than right, underlying atelectasis/consolidation cannot be excluded.     Dictated by (CST): Alondra Carmichael MD on 10/18/2021 at 2:04 PM     Finalized by (CST): Alondra Carmichael MD on 10/18/2021 at 2:06 PM          Meds:   No current outpatient shows EF 20% and graded 2 diastolic HF   -Discussed with cardiology      #hypoglycemic episodes  -overnight at 60's, now resolved  -If persistently hypoglycemic -consider D5 water infusion     #Hypertension  -entrestp  -BP well controlled currently     #Hy

## 2021-10-20 NOTE — PROGRESS NOTES
Assumed care 0700  Tele  NSR with hr-80s and frequent PVCs  O2 sat 96% room air. Voiding large amt of obi urine. Denies c/o pain. Failed video swallow study. Keep npo for now. Fall precaution  K 3.1-replaced per protocol  Will continue to mnitor.

## 2021-10-20 NOTE — PROGRESS NOTES
[facility]  Progress Note    Carole Ortega Patient Status:  Inpatient    10/10/1945 MRN [id number]   [facility] 2NE-A Attending Yoshi Qiu MD   Hosp Day # 2 PCP Madelaine García MD        Reason for consultation: ac No clubbing/cyanosis; moves all 4 extremities normally. 2+ B LE edema, slight skin pruning.      potassium chloride 40 mEq in sodium chloride 0.9% 250 mL IVPB, 40 mEq, Intravenous, Once  furosemide (LASIX) injection 40 mg, 40 mg, Intravenous, BID (Diuretic)

## 2021-10-20 NOTE — CM/SW NOTE
PT is recommending pt dc home with [facility] PT/OT. Referrals sent through [address] [place] [address].

## 2021-10-20 NOTE — CARDIAC REHAB
CHF teaching complete binder at bedside Pt verbalized understanding of information and will share with son when he comes

## 2021-10-20 NOTE — PLAN OF CARE
Assumed care of patient at 1500. A&O x4. On RA. NSR with frequent PVCs on tele. IV potassium replacement running. Strict NPO. Palliative consulted for goals of care discussion. Fall precautions in place. Primo fit draining yellow urine.  No assessment [place]

## 2021-10-20 NOTE — PLAN OF CARE
Received patient, alert and oriented. Denied chest pain, denied SOB. Claimed feeling better though his arms are still swollen. Discussed POC. Due meds given. Aspiration precautions maintained. Monitored I&O.  Safety measures reinforced, call light within re

## 2021-10-21 VITALS
OXYGEN SATURATION: 96 % | TEMPERATURE: 98 F | SYSTOLIC BLOOD PRESSURE: 114 MMHG | DIASTOLIC BLOOD PRESSURE: 69 MMHG | RESPIRATION RATE: 16 BRPM | HEART RATE: 96 BPM | WEIGHT: 143.75 LBS | HEIGHT: 65 IN | BODY MASS INDEX: 23.95 KG/M2

## 2021-10-21 PROCEDURE — 99223 1ST HOSP IP/OBS HIGH 75: CPT | Performed by: INTERNAL MEDICINE

## 2021-10-21 RX ORDER — ASPIRIN 81 MG/1
81 TABLET ORAL DAILY
Qty: 30 TABLET | Refills: 0 | Status: SHIPPED | OUTPATIENT
Start: 2021-10-22

## 2021-10-21 RX ORDER — FUROSEMIDE 40 MG/1
40 TABLET ORAL
Qty: 60 TABLET | Refills: 0 | Status: SHIPPED | OUTPATIENT
Start: 2021-10-21 | End: 2021-11-22

## 2021-10-21 RX ORDER — ASPIRIN 81 MG/1
81 TABLET ORAL DAILY
Status: DISCONTINUED | OUTPATIENT
Start: 2021-10-21 | End: 2021-10-21

## 2021-10-21 RX ORDER — FUROSEMIDE 40 MG/1
40 TABLET ORAL
Status: DISCONTINUED | OUTPATIENT
Start: 2021-10-21 | End: 2021-10-21

## 2021-10-21 NOTE — HOME CARE LIAISON
Received referral from [address] [place] [address]. Met with patient at bedside to discuss Residential home health and provide choice. Patient provided with list of Joby James providers from [facility], patient choice is [facility].  Agency reserved in [facility] and contact i

## 2021-10-21 NOTE — DISCHARGE SUMMARY
General Medicine Discharge Summary     Patient ID:  Lito Walter  64year old  10/10/1945    Admit date: 10/18/2021    Discharge date and time: 10/21/2021    Attending Physician: Timo Estevez MD     Primary Care Physician: Adamaris Mendes MD     Reason shows EF 20% and graded 2 diastolic HF   -Discussed with cardiology-okay to DC on Lasix 40 twice daily as well as [place] Mary  -Patient must follow-up closely with heart failure clinic and cardiology as an outpatient discussed with son extensively       # daily.      CONTINUE these medications which have NOT CHANGED    ATORVASTATIN 40 MG Oral Tab  Take 1 tablet (40 mg total) by mouth nightly. metoprolol succinate 25 MG Oral Tablet 24 Hr  Take 1 tablet (25 mg total) by mouth 2 (two) times a day.     tova

## 2021-10-21 NOTE — PROGRESS NOTES
[facility] Pall[facility] Liaison received palliative referral for community PC services. Waiting to obtain insurance (verification/authorization) prior to pursuing.          Bryce Shoemaker  Residential Palliative Liaison   [phone]

## 2021-10-21 NOTE — CM/SW NOTE
[facility]idential HH can accept for [facility]. Palliative care to meet with pt/family for [address] discussion. SW to follow for dc plan.

## 2021-10-21 NOTE — PHYSICAL THERAPY NOTE
PHYSICAL THERAPY TREATMENT NOTE - INPATIENT    Room Number: 5653/0864-J     Session: 1     Number of Visits to Meet Established Goals: 5    Presenting Problem: Acute on chronic CHF    ASSESSMENT   The patient is motivated to improve his strength and fun Turning over in bed (including adjusting bedclothes, sheets and blankets)?: None   -   Sitting down on and standing up from a chair with arms (e.g., wheelchair, bedside commode, etc.): None   -   Moving from lying on back to sitting on the side of the be Masked

## 2021-10-21 NOTE — PLAN OF CARE
Assumed care of patient at 0730. Alert and oriented x4. Tele rhythm NSR with frequent PVCs. On room air. Breath sounds clear bilaterally. Bed locked and in low position. Call light and personal items within reach.  No c/o chest pain, sob, or n/v. Pt contine color and temperature  - Assess for signs of decreased coronary artery perfusion - ex.  Angina  - Evaluate fluid balance, assess for edema, trend weights  Outcome: Progressing  Goal: Absence of cardiac arrhythmias or at baseline  Description: INTERVENTIONS:

## 2021-10-21 NOTE — SLP NOTE
SPEECH DAILY NOTE - INPATIENT    ASSESSMENT & PLAN   ASSESSMENT  Patient seen for ongoing dysphagia intervention and follow-up.   Pt is s/p VFSS yesterday which revealed (chronic) moderate-severe pharyngeal dysphagia  2/2  \"significantly reduced hyolarynge diet is desire, consider puree diet with mildly thick liquid with daily oral care/hygiene to reduce risk for development of oral bacterial pathogens    Diet Recommendations - Solids: NPO (ICE CHIPS OK) - may consider puree diet IF oral nutrition is within

## 2021-10-21 NOTE — CONSULTS
[facility] Patient Status:  Inpatient    10/10/1945 MRN [id number]   [facility] 2NE-A Attending Yoshi Qiu MD   Hosp Day # 3 PCP Madelaine García MD     Date of Consult: 10/21/2021 Pt mentioned that he does not want feeding tube. He wants to go home and hope for his swallow function to get better.  Discussed that this may be long term sequelae of the radiation that he received to his neck area many years ago and of progression of his Palliative Care criteria:  1) Is not effected by prognostication (can receive palliative care services if prognosis is in hours, days, months, years, decades)  2) May continue life-prolonging measures and treatments  3) Includes treatment of symptoms t DJD (degenerative joint disease), cervical 5/19/2016   • DVT (deep venous thrombosis) ([facility] 12/2/2013   • Exposure to medical diagnostic radiation    • Head and neck cancer ([facility] 7/25/2013   • Hearing impairment    • High blood pressure    • High cholester Assist       Normal or reduced       Full or confused                                  Significant Disease    50   Mainly sit/lie      Can't do any work      Partial Assist   Normal or reduced      Full or confused                                   Extensi Rectal, Once PRN  •  ondansetron (ZOFRAN) injection 4 mg, 4 mg, Intravenous, Q6H PRN  •  metoclopramide (REGLAN) injection 10 mg, 10 mg, Intravenous, Q8H PRN  [START ON 10/22/2021] aspirin 81 MG Oral Tab EC, Take 1 tablet (81 mg total) by mouth daily.   fur noted with all administered barium. PENETRATION:  Penetration is noted as well. OTHER:  Negative. PLEASE SEE SPEECH PATHOLOGIST REPORT FOR FULL ASSESSMENT OF SWALLOWING MECHANISM.     Dictated by (CST): Amanda Nunn MD on 10/20/2021 at 1:03 PM     Anne Araya A total of   70 minutes were spent on this consult, which included all of the following:direct face to face contact, history taking, physical examination, and >50% was spent counseling and coordinating care.     DNAR/select CODE STATUS    Completed POLS

## 2021-10-21 NOTE — PROGRESS NOTES
[facility]  Progress Note    Les Markie Patient Status:  Inpatient    10/10/1945 MRN [id number]   [facility] 2NE-A Attending Whitley Mahmood MD   Hosp Day # 3 PCP Piotr Preston MD        Reason for consultation: ac auscultation bilaterally; no accessory muscle use  Abdomen: Soft, non-tender; bowel sounds are normoactive  Extremities: No clubbing/cyanosis; moves all 4 extremities normally. 2+ B LE edema, slight skin pruning.      potassium chloride 40 mEq in sodium chl me if you have any questions.     Winnie Ochoa MD

## 2021-10-21 NOTE — DIETARY NOTE
[facility]     Admitting diagnosis:  Bilateral pleural effusion [J90]  Acute on chronic systolic CHF (congestive heart failure) (HCC) [I50.23]    Ht: 165.1 cm (5' 5\")  Wt: 65.2 kg (143 lb 11.8 oz).    Body mass i

## 2021-10-22 NOTE — PLAN OF CARE
Received report on this Pt. , with bedside rounding at 1300. Pt., awake, A&Ox4, calm and cooperative. Pt., was in SR on Tele monitor, sats greater than 92% on RA. Pt., denies any pain or distress. Pt., up with SBA and walker to the BR.  Lasix administered wi

## 2021-10-29 PROBLEM — J90 BILATERAL PLEURAL EFFUSION: Status: RESOLVED | Noted: 2021-10-18 | Resolved: 2021-10-29

## 2021-10-29 PROBLEM — I50.23 ACUTE ON CHRONIC SYSTOLIC CHF (CONGESTIVE HEART FAILURE) (HCC): Status: RESOLVED | Noted: 2021-10-18 | Resolved: 2021-10-29

## 2021-11-14 PROBLEM — I34.0 MR (MITRAL REGURGITATION): Status: ACTIVE | Noted: 2021-11-14

## 2023-05-17 NOTE — VIDEO SWALLOW STUDY NOTE
ADULT VIDEOFLUOROSCOPIC SWALLOWING STUDY    Admission Date: 10/18/2021  Evaluation Date: 10/20/21  Radiologist: Dr. Santos Cook: NPO (ICE CHIPS OK)  Diet Recommendations - Liquids: NPO (ICE CHIPS OK)    Further Assistance/Support for ADL's  Prior Living Situation: Home with support  History of Recent: Pneumonia  Precautions: Aspiration  Imaging results: CXR CONCLUSION:     1. Bilateral pleural effusions, left greater than right, underlying atelectasis/consolidati swallow  Tracheal Aspiration: After the swallow  Cough/Throat Clear Response: Yes  Cough/Throat Clear Effective: No  Strategy(ies) Implemented (Nectar Thick): Liquids via spoon;Effortful swallow  Effectiveness: No  HONEY THICK LIQUIDS/ MODERATELY THICK   M that VFSS, pt declined enteral means of nutrition/hydration and was upgraded to MS-chopped diet with HTL via spoon with double swallow. Pt was viewed in the lateral plane.   Pt was alert and participatory throughout study and was able to feed himself fr assistance 8/10x    In Progress     PLAN: Dysphagia tx    EDUCATION/INSTRUCTION  Reviewed results and recommendations with patient/family/caregiver. Agreement/Understanding verbalized and all questions answered to their apparent satisfaction.     INTERDISC 20.9

## 2025-04-30 NOTE — CONSULTS
[facility]  Report of Consultation    Pillo Garg Patient Status:  Emergency    10/10/1945 MRN [id number]   Location [facility] [facility] Attending Vu Horvath MD   Hosp Day # 0 PCP Norvel Lefort, MD     Reason for [facility] stenosis     Ectasia of artery (HCC)     Azotemia     Anemia     Gastrointestinal hemorrhage        History:  Past Medical History:   Diagnosis Date   • Basal cell carcinoma, eyelid 12/2/2013   • Chronic back pain 12/2/2013   • DVT (deep venous thrombosis) chest pain.   (+)  FOSS; no palpitations   GI: as above  GENITAL//GYN: no dysuria, urgency or frequency;  no nocturia  MUSCULOSKELETAL: no new joint complaints upper or lower extremities  NEURO: no new sensory or motor complaint  PSYCHE: mood is unchanged acid reflux, having trouble with balance. denies chest pain. , 98% room air, in waiting room.       TECHNIQUE:  CT scanning was performed from the dome of the diaphragm to the pubic symphysis with non-ionic intravenous contrast material. Post contrast coron primary team Specific etiology for pain is not evident from this study. 2. There is diverticulosis of colon without CT evidence of diverticulitis. 3. There is aortic atherosclerosis. 4. There is elevation left hemidiaphragm.    5. There is coronary artery atherosc

## (undated) DEVICE — STERILE SYNTHETIC POLYISOPRENE POWDER-FREE SURGICAL GLOVES WITH HYDROGEL COATING, SMOOTH FINISH, STRAIGHT FINGER: Brand: PROTEXIS

## (undated) DEVICE — SYRINGE/GUAGE ASSEMBLY

## (undated) DEVICE — ESOPHAGEAL BALLOON DILATATION CATHETER: Brand: CRE FIXED WIRE

## (undated) DEVICE — SYRINGE IV FLUSH PRE-FILL10M

## (undated) DEVICE — 1200CC GUARDIAN II: Brand: GUARDIAN

## (undated) DEVICE — SUTURE SILK 2-0 SH

## (undated) DEVICE — 3M™ STERI-DRAPE™ INSTRUMENT POUCH 1018: Brand: STERI-DRAPE™

## (undated) DEVICE — FORCEP BIOPSY RJ4 LG CAP W/ND

## (undated) DEVICE — LARGE, DISPOSABLE ALEXIS O C-SECTION PROTECTOR - RETRACTOR: Brand: ALEXIS ® O C-SECTION PROTECTOR - RETRACTOR

## (undated) DEVICE — HARMONIC ACE +7 SHEARS ADVANCED HEMOSTASIS 5MM DIAMETER 23CM SHAFT LENGTH  FOR USE WITH GRAY HAND PIECE ONLY: Brand: HARMONIC ACE

## (undated) DEVICE — ALCOHOL PREP,2 PLY, MEDIUM: Brand: WEBCOL

## (undated) DEVICE — ENDOSCOPY PACK - LOWER: Brand: MEDLINE INDUSTRIES, INC.

## (undated) DEVICE — SUTURE PDS II 1 TP-1

## (undated) DEVICE — NEEDLE CONTRAST INTERJECT 25G

## (undated) DEVICE — Device: Brand: DEFENDO AIR/WATER/SUCTION AND BIOPSY VALVE

## (undated) DEVICE — SUTURE PROLENE 5-0 RB-1

## (undated) DEVICE — LIGHT HANDLE

## (undated) DEVICE — 3M™ RED DOT™ MONITORING ELECTRODE WITH FOAM TAPE AND STICKY GEL, 50/BAG, 20/CASE, 72/PLT 2570: Brand: RED DOT™

## (undated) DEVICE — REM POLYHESIVE ADULT PATIENT RETURN ELECTRODE: Brand: VALLEYLAB

## (undated) DEVICE — ECHELON FLEX POWERED PLUS COMPACT ARTICULATING ENDOSCOPIC LINEAR CUTTER, 60MM: Brand: ECHELON FLEX

## (undated) DEVICE — HEMOCLIP HORIZON LG 004200

## (undated) DEVICE — FILTERLINE NASAL ADULT O2/CO2

## (undated) DEVICE — SUTURE PROLENE 2-0 MH

## (undated) DEVICE — PROVIDES A STERILE INTERFACE BETWEEN THE OPERATING ROOM SURGICAL LAMPS (NON-STERILE) AND THE SURGEON OR NURSE (STERILE).: Brand: STERION®CLAMP COVER FABRIC

## (undated) DEVICE — DRAPE HALF 40X58 DYNJP2410

## (undated) DEVICE — SUTURE VICRYL 3-0 SH

## (undated) DEVICE — Device: Brand: SPOT EX ENDOSCOPIC TATTOO

## (undated) DEVICE — SCD SLEEVE KNEE HI BLEND

## (undated) DEVICE — SUTURE VICRYL 2-0 SH

## (undated) DEVICE — HEMOCLIP HORIZON MED 002200

## (undated) DEVICE — SUTURE SILK 3-0 SH

## (undated) DEVICE — SUTURE PROLENE 4-0 RB-1

## (undated) DEVICE — GLOVE SURG SENSICARE SZ 7

## (undated) DEVICE — ENDOSCOPY PACK UPPER: Brand: MEDLINE INDUSTRIES, INC.

## (undated) DEVICE — SOL  .9 1000ML BTL

## (undated) DEVICE — SYRINGE 10ML SLIP TIP

## (undated) DEVICE — ENDOPATH ECHELON ENDOSCOPIC LINEAR CUTTER RELOADS, GREEN, 60MM: Brand: ECHELON ENDOPATH

## (undated) DEVICE — SUTURE PROLENE 3-0 SH

## (undated) DEVICE — STERILE POLYISOPRENE POWDER-FREE SURGICAL GLOVES: Brand: PROTEXIS

## (undated) DEVICE — BITE BLOCK ADULT 60F ELASTIC

## (undated) NOTE — LETTER
Patient Name: Dewey Chase        : 10/10/1945       Medical Record #: [id number]    CONSENT FOR PROCEDURES/SEDATION    Date: 2021       Time: 2:37 PM        1.  I authorize the performance upon Dewey Chase the following:  Upper endoscopy with

## (undated) NOTE — MR AVS SNAPSHOT
After Visit Summary   3/20/2017    Alexis Jin    MRN: [id number]           Visit Information        Provider Department Dept Phone    3/20/2017  5:30 PM SHABANA RIVERS, DO [facility] [address] [place] [phone]      Your Vitals Were     BP Pulse Temp(S needs your attention. When the body heals, pain normally goes away. When pain lasts longer than six months, it is called “chronic” pain.  This is pain that is present even after the body has healed. Chronic pain can cause mood problems and get in the way o · Getting enough sleep at night  · Stopping smoking and limiting alcohol use  · Losing excess weight  Follow-up care  Follow up with your healthcare provider as advised.  Let your healthcare provider know if your current treatment plan is working or if Demetrius The Academy of [employer] and Dietetics has created a diet plan for people with dysphagia. The plan is called the National Dysphagia Diet. The dysphagia diet has 4 levels of foods. The levels are:  · Level 1.  These are foods that are pureed or smooth, like straight. You may need support pillows to get into the best position. It may also help to have few distractions while eating or drinking. Changing between solid food and liquids may also help your swallowing.  Stay upright for at least 30 minutes after eati dysphagia diet. Explain which foods and liquids you can and cannot have.     When to call your health care provider  Call 911 if you have trouble breathing because of food blocking your airway.   Call your health care provider right away if you have any of Eating tips  · Eat slowly in a relaxed setting. · Don’t talk while you eat. · Take small bites and chew slowly and thoroughly  · Sit in an upright position during and after meals.   · Ask your provider about any special diets that may help, such as liquid taking your blood pressure a number of times. The blood pressures between normal and high are called prehypertension. Home care  If you have high blood pressure, you should do what is listed below to lower your blood pressure.  If you are taking medicines Follow-up care  You will need to make regular visits to your health care provider. This is to check your blood pressure and to make changes to your medicines. Make a follow-up appointment as directed.   When to seek medical advice  Call your health care pro Create a [employer] Username. This will be your [employer] login Username and cannot be changed, so think of one that is secure and easy to remember. Create a [employer] password. You can change your password at any time.     Choose a Security Question and enter

## (undated) NOTE — LETTER
Heather Ribera [address]  [address] [facility] [address], [address] [facility]  Authorization for Surgical Operation and Procedure     Date:___________                                                                                                         Time:__________ potential risks that can occur: fever and allergic reactions, hemolytic reactions, transmission of diseases such as Hepatitis, AIDS and Cytomegalovirus (CMV) and fluid overload.   In the event that I wish to have an autologous transfusion of my own blood, o will determine when the applicable recovery period ends for purposes of reinstating the DNAR order.   10. Patients having a sterilization procedure: I understand that if the procedure is successful the results will be permanent and it will therefore be impo (anesthesia doctor) to give me medicine and do additional procedures as necessary.  Some examples are: Starting or using an “IV” to give me medicine, fluids or blood during my procedure, and having a breathing tube placed to help me breathe when I’m asleep blocks”): I understand that rare but potential complications include headache, bleeding, infection, seizure, irregular heart rhythms, and nerve injury.     I can change my mind about having anesthesia services at any time before I get the medicine.    ____

## (undated) NOTE — LETTER
Heather Ribera [address]  [address] [facility] [address], [address] [facility]  Authorization for Surgical Operation and Procedure     Date:___________                                                                                                         Time:__________ are some, but not all, of the potential risks that can occur: fever and allergic reactions, hemolytic reactions, transmission of diseases such as Hepatitis, AIDS and Cytomegalovirus (CMV) and fluid overload.   In the event that I wish to have an autologous surgeon or my attending physician will determine when the applicable recovery period ends for purposes of reinstating the DNAR order.   10. Patients having a sterilization procedure: I understand that if the procedure is successful the results will be perma Allow the anesthesiologist (anesthesia doctor) to give me medicine and do additional procedures as necessary.  Some examples are: Starting or using an “IV” to give me medicine, fluids or blood during my procedure, and having a breathing tube placed to help “epidural”, & “nerve blocks”): I understand that rare but potential complications include headache, bleeding, infection, seizure, irregular heart rhythms, and nerve injury.     I can change my mind about having anesthesia services at any time before I get

## (undated) NOTE — LETTER
[facility] [address], [address]  Consent for Procedure/Sedation    Date: 5/25/2021    Time: 1950      1.  I authorize the performance upon Aristeo Terrazas the following:cardiac catheterization, left ventricular cineangiography, bi Signature of person authorized                                     Relationship to  to consent for patient: _________________________ patient: ___________________    Witness: _______________________________ Date: _____________________    Printed: 5/25/2021

## (undated) NOTE — LETTER
[facility] [address], [address] [place] [address]    Consent for Anesthesia   1.    Anton Kristan agree to be cared for by a physician anesthesiologist alone and/or with a nurse anesthetist, who is specially trained to monitor me and give me m allergic reactions to medications, injury to my airway, heart, lungs, vision, nerves, or muscles and in extremely rare instances death. 5. My doctor has explained to me other choices available to me for my care (alternatives).   6. Pregnant Patients (“epid Printed: 5/27/2021 at 1:17 PM    Medical Record #: [id number]                                            Page 1 of 1

## (undated) NOTE — MR AVS SNAPSHOT
[facility]  [address] [facility] [address] [phone]               Thank you for choosing us for your health care visit with Duyen Casper PA-C.   We are glad to serve you and happy to provide you with this summary of y TAKE 1 TABLET (50 MG TOTAL) BY MOUTH DAILY. Commonly known as:  TENORMIN           * diazepam 10 MG Tabs   Take 1 tablet (10 mg total) by mouth 2 (two) times daily as needed for Anxiety.    Commonly known as:  VALIUM           * diazepam 10 MG Tabs   Take Make half your plate fruits and vegetables Highly refined, white starches including white bread, rice and pasta   Eat plenty of protein, keep the fat content low Sugars:  sodas and sports drinks, candies and desserts   Eat plenty of low-fat dairy products

## (undated) NOTE — LETTER
Heather Ribera [address] [facility] [address], [address] [facility]  Authorization for Surgical Operation and Procedure   Date:___________                                                                                            Time:__________  1.  I hereby aut can occur: fever and allergic reactions, hemolytic reactions, transmission of diseases such as Hepatitis, AIDS and Cytomegalovirus (CMV) and fluid overload.   In the event that I wish to have an autologous transfusion of my own blood, or a directed donor tr the applicable recovery period ends for purposes of reinstating the DNAR order.   10. Patients having a sterilization procedure: I understand that if the procedure is successful the results will be permanent and it will therefore be impossible for me to ins

## (undated) NOTE — LETTER
11/6/2017    Daylin Agent   [address] [address]    Dear Austin Nydia Murray,     [address] [facility] office has been trying to contact you to discuss your recent test results or you are due for an appointment with your provider.   It is important that we jen

## (undated) NOTE — LETTER
[facility] [address], [address]  Consent for Procedure/Sedation    Date: May 27, 2021                                               Time: 17:30      1.  I authorize the performance upon Ramona Gentile the following:  Endoscopic u Printed: 2021   5:01 PM  Patient Name: Manas Hardy        : 10/10/1945       Medical Record #: [id number]

## (undated) NOTE — LETTER
[facility] [address], [address] [place] [address]    Consent for Anesthesia   1.    Abigail Vargas agree to be cared for by a physician anesthesiologist alone and/or with a nurse anesthetist, who is specially trained to monitor me and give me m allergic reactions to medications, injury to my airway, heart, lungs, vision, nerves, or muscles and in extremely rare instances death. 5. My doctor has explained to me other choices available to me for my care (alternatives).   6. Pregnant Patients (“epid Printed: 5/27/2021 at 7:05 AM    Medical Record #: [id number]                                            Page 1 of 1

## (undated) NOTE — LETTER
Patient Name: Richelle Adams        : 10/10/1945       Medical Record #: [id number]    CONSENT FOR PROCEDURES/SEDATION    Date: 2021       Time: 2:39 PM        1.  I authorize the performance upon Richelle Adams the following:  colonoscopy with pos

## (undated) NOTE — LETTER
1/29/2018    [address]  [address] [facility] [address]    Dear  Farida Chauhan,     [address] [facility] office has been trying to contact you to discuss your recent test results or you are due for an appointment with your provider.   It is important that we jen

## (undated) NOTE — MR AVS SNAPSHOT
[facility] [address], [address] [phone]               Thank you for choosing us for your health care visit with [address] [place] [address], .   We are glad to serve you and happy to provide you with this s authorization, such as [place], please feel free to schedule your appointment immediately. However, if you are unsure about the requirements for authorization, please wait 5-7 days and then contact your physician's office.  At that time, you will lifestyle changes. You will work closely with your healthcare provider to find a treatment plan that works best for you. · Ask your healthcare provider for a referral to a pain management specialty center.  These can provide the most recent and proven pain When you have dysphagia, you are at risk for aspiration. Aspiration is when food or liquid enters the lungs by accident. It can cause pneumonia and other problems. The foods you eat can affect your ability to swallow.  For example, soft foods are easier to crackers, chips, and other snack foods. · Level 4. This level includes all foods. You will also need to be careful about the liquids you drink. Talk with your SLP about the liquids that are allowed on your dysphagia diet.  Here is more information on marian may need follow-up tests such as a fiberoptic endoscopic evaluation of swallowing (FEES) test. If your swallowing gets better or worse, your SLP may change your dysphagia diet over time.  In time you may be able to eat and drink foods and liquids of all kin history and some special tests. Your provider will make a treatment plan based on the results of your evaluation. You may need to take medicines. In some cases, your provider may suggest a procedure to stretch or widen your esophagus (esophageal dilation). substitute for professional medical care. Always follow your healthcare professional's instructions. Established High Blood Pressure    High blood pressure (hypertension) is a chronic disease. Often health care providers don’t know what causes it.  B hard. Even brisk walking for 20 minutes 3 times a week is a good form of exercise. · Don’t take medicines that have heart stimulants. This includes many cold and sinus decongestant pills and sprays, as well as diet pills.  Check the warnings about hyperten substitute for professional medical care. Always follow your healthcare professional's instructions.              Allergies as of Mar 20, 2017     Scallops Diarrhea, Nausea and vomiting                Today's Vital Signs     BP Pulse Temp Height Weight BMI EAT THESE FOODS MORE OFTEN: EAT THESE FOODS LESS OFTEN:   Make half your plate fruits and vegetables Highly refined, white starches including white bread, rice and pasta   Eat plenty of protein, keep the fat content low Sugars:  sodas and sports drinks, ca